# Patient Record
Sex: FEMALE | Race: WHITE | URBAN - METROPOLITAN AREA
[De-identification: names, ages, dates, MRNs, and addresses within clinical notes are randomized per-mention and may not be internally consistent; named-entity substitution may affect disease eponyms.]

---

## 2021-05-26 ENCOUNTER — RECORDS - HEALTHEAST (OUTPATIENT)
Dept: ADMINISTRATIVE | Facility: CLINIC | Age: 41
End: 2021-05-26

## 2021-05-29 ENCOUNTER — RECORDS - HEALTHEAST (OUTPATIENT)
Dept: ADMINISTRATIVE | Facility: CLINIC | Age: 41
End: 2021-05-29

## 2021-05-31 ENCOUNTER — RECORDS - HEALTHEAST (OUTPATIENT)
Dept: ADMINISTRATIVE | Facility: CLINIC | Age: 41
End: 2021-05-31

## 2025-02-27 ENCOUNTER — APPOINTMENT (OUTPATIENT)
Dept: CT IMAGING | Facility: HOSPITAL | Age: 45
DRG: 312 | End: 2025-02-27
Attending: EMERGENCY MEDICINE
Payer: COMMERCIAL

## 2025-02-27 ENCOUNTER — HOSPITAL ENCOUNTER (OUTPATIENT)
Facility: HOSPITAL | Age: 45
Setting detail: OBSERVATION
End: 2025-02-27
Attending: EMERGENCY MEDICINE | Admitting: INTERNAL MEDICINE
Payer: COMMERCIAL

## 2025-02-27 VITALS
WEIGHT: 136.69 LBS | BODY MASS INDEX: 24.22 KG/M2 | HEIGHT: 63 IN | DIASTOLIC BLOOD PRESSURE: 49 MMHG | SYSTOLIC BLOOD PRESSURE: 86 MMHG | HEART RATE: 73 BPM | RESPIRATION RATE: 18 BRPM | TEMPERATURE: 97.8 F | OXYGEN SATURATION: 94 %

## 2025-02-27 DIAGNOSIS — E83.42 HYPOMAGNESEMIA: ICD-10-CM

## 2025-02-27 DIAGNOSIS — E87.1 HYPONATREMIA: ICD-10-CM

## 2025-02-27 DIAGNOSIS — D64.9 ANEMIA, UNSPECIFIED TYPE: ICD-10-CM

## 2025-02-27 DIAGNOSIS — R55 SYNCOPE, UNSPECIFIED SYNCOPE TYPE: ICD-10-CM

## 2025-02-27 DIAGNOSIS — T14.8XXA INTRAMUSCULAR HEMATOMA: ICD-10-CM

## 2025-02-27 LAB
ABO + RH BLD: NORMAL
ANION GAP SERPL CALCULATED.3IONS-SCNC: 8 MMOL/L (ref 7–15)
BASOPHILS # BLD AUTO: 0 10E3/UL (ref 0–0.2)
BASOPHILS NFR BLD AUTO: 0 %
BLD GP AB SCN SERPL QL: NEGATIVE
BUN SERPL-MCNC: 9.4 MG/DL (ref 6–20)
CALCIUM SERPL-MCNC: 8.8 MG/DL (ref 8.8–10.4)
CHLORIDE SERPL-SCNC: 95 MMOL/L (ref 98–107)
CREAT SERPL-MCNC: 0.54 MG/DL (ref 0.51–0.95)
EGFRCR SERPLBLD CKD-EPI 2021: >90 ML/MIN/1.73M2
EOSINOPHIL # BLD AUTO: 0 10E3/UL (ref 0–0.7)
EOSINOPHIL NFR BLD AUTO: 0 %
ERYTHROCYTE [DISTWIDTH] IN BLOOD BY AUTOMATED COUNT: 13.3 % (ref 10–15)
GLUCOSE SERPL-MCNC: 231 MG/DL (ref 70–99)
HCO3 SERPL-SCNC: 26 MMOL/L (ref 22–29)
HCT VFR BLD AUTO: 28.6 % (ref 35–47)
HGB BLD-MCNC: 9.8 G/DL (ref 11.7–15.7)
IMM GRANULOCYTES # BLD: 0.1 10E3/UL
IMM GRANULOCYTES NFR BLD: 1 %
INR PPP: 1.07 (ref 0.85–1.15)
LYMPHOCYTES # BLD AUTO: 0.6 10E3/UL (ref 0.8–5.3)
LYMPHOCYTES NFR BLD AUTO: 5 %
MAGNESIUM SERPL-MCNC: 1.4 MG/DL (ref 1.7–2.3)
MCH RBC QN AUTO: 32.9 PG (ref 26.5–33)
MCHC RBC AUTO-ENTMCNC: 34.3 G/DL (ref 31.5–36.5)
MCV RBC AUTO: 96 FL (ref 78–100)
MONOCYTES # BLD AUTO: 0.5 10E3/UL (ref 0–1.3)
MONOCYTES NFR BLD AUTO: 4 %
NEUTROPHILS # BLD AUTO: 12.2 10E3/UL (ref 1.6–8.3)
NEUTROPHILS NFR BLD AUTO: 91 %
NRBC # BLD AUTO: 0 10E3/UL
NRBC BLD AUTO-RTO: 0 /100
PLATELET # BLD AUTO: 210 10E3/UL (ref 150–450)
POTASSIUM SERPL-SCNC: 4.3 MMOL/L (ref 3.4–5.3)
RBC # BLD AUTO: 2.98 10E6/UL (ref 3.8–5.2)
SODIUM SERPL-SCNC: 129 MMOL/L (ref 135–145)
SPECIMEN EXP DATE BLD: NORMAL
TROPONIN T SERPL HS-MCNC: <6 NG/L
WBC # BLD AUTO: 13.4 10E3/UL (ref 4–11)

## 2025-02-27 PROCEDURE — 82310 ASSAY OF CALCIUM: CPT | Performed by: EMERGENCY MEDICINE

## 2025-02-27 PROCEDURE — 250N000011 HC RX IP 250 OP 636: Performed by: EMERGENCY MEDICINE

## 2025-02-27 PROCEDURE — 84484 ASSAY OF TROPONIN QUANT: CPT | Performed by: EMERGENCY MEDICINE

## 2025-02-27 PROCEDURE — 85610 PROTHROMBIN TIME: CPT | Performed by: EMERGENCY MEDICINE

## 2025-02-27 PROCEDURE — 96374 THER/PROPH/DIAG INJ IV PUSH: CPT

## 2025-02-27 PROCEDURE — 80048 BASIC METABOLIC PNL TOTAL CA: CPT | Performed by: EMERGENCY MEDICINE

## 2025-02-27 PROCEDURE — 86850 RBC ANTIBODY SCREEN: CPT | Performed by: EMERGENCY MEDICINE

## 2025-02-27 PROCEDURE — 86900 BLOOD TYPING SEROLOGIC ABO: CPT | Performed by: EMERGENCY MEDICINE

## 2025-02-27 PROCEDURE — 93005 ELECTROCARDIOGRAM TRACING: CPT | Performed by: STUDENT IN AN ORGANIZED HEALTH CARE EDUCATION/TRAINING PROGRAM

## 2025-02-27 PROCEDURE — 83735 ASSAY OF MAGNESIUM: CPT | Performed by: EMERGENCY MEDICINE

## 2025-02-27 PROCEDURE — 85025 COMPLETE CBC W/AUTO DIFF WBC: CPT | Performed by: EMERGENCY MEDICINE

## 2025-02-27 PROCEDURE — G0378 HOSPITAL OBSERVATION PER HR: HCPCS

## 2025-02-27 PROCEDURE — 72191 CT ANGIOGRAPH PELV W/O&W/DYE: CPT

## 2025-02-27 PROCEDURE — 99285 EMERGENCY DEPT VISIT HI MDM: CPT | Mod: 25

## 2025-02-27 PROCEDURE — 258N000003 HC RX IP 258 OP 636: Performed by: EMERGENCY MEDICINE

## 2025-02-27 PROCEDURE — 99223 1ST HOSP IP/OBS HIGH 75: CPT | Performed by: INTERNAL MEDICINE

## 2025-02-27 PROCEDURE — 258N000003 HC RX IP 258 OP 636: Performed by: INTERNAL MEDICINE

## 2025-02-27 PROCEDURE — 36415 COLL VENOUS BLD VENIPUNCTURE: CPT | Performed by: EMERGENCY MEDICINE

## 2025-02-27 RX ORDER — MAGNESIUM SULFATE HEPTAHYDRATE 40 MG/ML
2 INJECTION, SOLUTION INTRAVENOUS ONCE
Status: COMPLETED | OUTPATIENT
Start: 2025-02-27 | End: 2025-02-27

## 2025-02-27 RX ORDER — HYDROMORPHONE HCL IN WATER/PF 6 MG/30 ML
0.4 PATIENT CONTROLLED ANALGESIA SYRINGE INTRAVENOUS
Status: ACTIVE | OUTPATIENT
Start: 2025-02-27

## 2025-02-27 RX ORDER — NALOXONE HYDROCHLORIDE 0.4 MG/ML
0.4 INJECTION, SOLUTION INTRAMUSCULAR; INTRAVENOUS; SUBCUTANEOUS
Status: ACTIVE | OUTPATIENT
Start: 2025-02-27

## 2025-02-27 RX ORDER — NICOTINE 21 MG/24HR
1 PATCH, TRANSDERMAL 24 HOURS TRANSDERMAL DAILY
Status: ACTIVE | OUTPATIENT
Start: 2025-02-28

## 2025-02-27 RX ORDER — OXYCODONE HYDROCHLORIDE 5 MG/1
5 TABLET ORAL EVERY 4 HOURS PRN
Status: ACTIVE | OUTPATIENT
Start: 2025-02-27

## 2025-02-27 RX ORDER — NALOXONE HYDROCHLORIDE 0.4 MG/ML
0.2 INJECTION, SOLUTION INTRAMUSCULAR; INTRAVENOUS; SUBCUTANEOUS
Status: ACTIVE | OUTPATIENT
Start: 2025-02-27

## 2025-02-27 RX ORDER — IOPAMIDOL 755 MG/ML
90 INJECTION, SOLUTION INTRAVASCULAR ONCE
Status: COMPLETED | OUTPATIENT
Start: 2025-02-27 | End: 2025-02-27

## 2025-02-27 RX ORDER — HYDROMORPHONE HCL IN WATER/PF 6 MG/30 ML
0.2 PATIENT CONTROLLED ANALGESIA SYRINGE INTRAVENOUS
Status: ACTIVE | OUTPATIENT
Start: 2025-02-27

## 2025-02-27 RX ORDER — ONDANSETRON 2 MG/ML
4 INJECTION INTRAMUSCULAR; INTRAVENOUS EVERY 6 HOURS PRN
Status: ACTIVE | OUTPATIENT
Start: 2025-02-27

## 2025-02-27 RX ORDER — ACETAMINOPHEN 500 MG
500-1000 TABLET ORAL EVERY 6 HOURS PRN
Status: ON HOLD | COMMUNITY

## 2025-02-27 RX ORDER — SODIUM CHLORIDE, SODIUM LACTATE, POTASSIUM CHLORIDE, CALCIUM CHLORIDE 600; 310; 30; 20 MG/100ML; MG/100ML; MG/100ML; MG/100ML
INJECTION, SOLUTION INTRAVENOUS CONTINUOUS
Status: ACTIVE | OUTPATIENT
Start: 2025-02-27

## 2025-02-27 RX ORDER — ONDANSETRON 4 MG/1
4 TABLET, ORALLY DISINTEGRATING ORAL EVERY 6 HOURS PRN
Status: ACTIVE | OUTPATIENT
Start: 2025-02-27

## 2025-02-27 RX ORDER — DOCUSATE SODIUM 100 MG/1
100 CAPSULE, LIQUID FILLED ORAL 2 TIMES DAILY
Status: ACTIVE | OUTPATIENT
Start: 2025-02-27

## 2025-02-27 RX ORDER — PROCHLORPERAZINE MALEATE 10 MG
10 TABLET ORAL EVERY 6 HOURS PRN
Status: ACTIVE | OUTPATIENT
Start: 2025-02-27

## 2025-02-27 RX ADMIN — MAGNESIUM SULFATE HEPTAHYDRATE 2 G: 40 INJECTION, SOLUTION INTRAVENOUS at 22:24

## 2025-02-27 RX ADMIN — IOPAMIDOL 90 ML: 755 INJECTION, SOLUTION INTRAVENOUS at 20:53

## 2025-02-27 RX ADMIN — SODIUM CHLORIDE, SODIUM LACTATE, POTASSIUM CHLORIDE, AND CALCIUM CHLORIDE: .6; .31; .03; .02 INJECTION, SOLUTION INTRAVENOUS at 23:52

## 2025-02-27 RX ADMIN — SODIUM CHLORIDE 1000 ML: 0.9 INJECTION, SOLUTION INTRAVENOUS at 22:24

## 2025-02-27 ASSESSMENT — COLUMBIA-SUICIDE SEVERITY RATING SCALE - C-SSRS
1. IN THE PAST MONTH, HAVE YOU WISHED YOU WERE DEAD OR WISHED YOU COULD GO TO SLEEP AND NOT WAKE UP?: NO
6. HAVE YOU EVER DONE ANYTHING, STARTED TO DO ANYTHING, OR PREPARED TO DO ANYTHING TO END YOUR LIFE?: NO
2. HAVE YOU ACTUALLY HAD ANY THOUGHTS OF KILLING YOURSELF IN THE PAST MONTH?: NO

## 2025-02-27 ASSESSMENT — ENCOUNTER SYMPTOMS
NUMBNESS: 0
DIZZINESS: 1
HEADACHES: 0

## 2025-02-27 ASSESSMENT — ACTIVITIES OF DAILY LIVING (ADL)
ADLS_ACUITY_SCORE: 41

## 2025-02-28 ENCOUNTER — APPOINTMENT (OUTPATIENT)
Dept: ULTRASOUND IMAGING | Facility: HOSPITAL | Age: 45
DRG: 312 | End: 2025-02-28
Attending: EMERGENCY MEDICINE
Payer: COMMERCIAL

## 2025-02-28 ENCOUNTER — APPOINTMENT (OUTPATIENT)
Dept: OCCUPATIONAL THERAPY | Facility: HOSPITAL | Age: 45
DRG: 312 | End: 2025-02-28
Attending: PHYSICIAN ASSISTANT
Payer: COMMERCIAL

## 2025-02-28 LAB
ALBUMIN SERPL BCG-MCNC: 3.5 G/DL (ref 3.5–5.2)
ALP SERPL-CCNC: 36 U/L (ref 40–150)
ALT SERPL W P-5'-P-CCNC: 17 U/L (ref 0–50)
ANION GAP SERPL CALCULATED.3IONS-SCNC: 4 MMOL/L (ref 7–15)
AST SERPL W P-5'-P-CCNC: 25 U/L (ref 0–45)
ATRIAL RATE - MUSE: 93 BPM
BASOPHILS # BLD AUTO: 0 10E3/UL (ref 0–0.2)
BASOPHILS NFR BLD AUTO: 0 %
BILIRUB SERPL-MCNC: 0.3 MG/DL
BUN SERPL-MCNC: 7 MG/DL (ref 6–20)
CALCIUM SERPL-MCNC: 8.9 MG/DL (ref 8.8–10.4)
CHLORIDE SERPL-SCNC: 108 MMOL/L (ref 98–107)
CREAT SERPL-MCNC: 0.59 MG/DL (ref 0.51–0.95)
DIASTOLIC BLOOD PRESSURE - MUSE: 68 MMHG
EGFRCR SERPLBLD CKD-EPI 2021: >90 ML/MIN/1.73M2
EOSINOPHIL # BLD AUTO: 0 10E3/UL (ref 0–0.7)
EOSINOPHIL NFR BLD AUTO: 0 %
ERYTHROCYTE [DISTWIDTH] IN BLOOD BY AUTOMATED COUNT: 13.4 % (ref 10–15)
GLUCOSE SERPL-MCNC: 115 MG/DL (ref 70–99)
HCO3 SERPL-SCNC: 28 MMOL/L (ref 22–29)
HCT VFR BLD AUTO: 24.4 % (ref 35–47)
HGB BLD-MCNC: 7.1 G/DL (ref 11.7–15.7)
HGB BLD-MCNC: 8.5 G/DL (ref 11.7–15.7)
HGB BLD-MCNC: 8.7 G/DL (ref 11.7–15.7)
HGB BLD-MCNC: 8.8 G/DL (ref 11.7–15.7)
IMM GRANULOCYTES # BLD: 0 10E3/UL
IMM GRANULOCYTES NFR BLD: 0 %
INTERPRETATION ECG - MUSE: NORMAL
LYMPHOCYTES # BLD AUTO: 1.1 10E3/UL (ref 0.8–5.3)
LYMPHOCYTES NFR BLD AUTO: 10 %
MAGNESIUM SERPL-MCNC: 2.2 MG/DL (ref 1.7–2.3)
MCH RBC QN AUTO: 33.7 PG (ref 26.5–33)
MCHC RBC AUTO-ENTMCNC: 34.8 G/DL (ref 31.5–36.5)
MCV RBC AUTO: 97 FL (ref 78–100)
MONOCYTES # BLD AUTO: 1.1 10E3/UL (ref 0–1.3)
MONOCYTES NFR BLD AUTO: 10 %
NEUTROPHILS # BLD AUTO: 9.3 10E3/UL (ref 1.6–8.3)
NEUTROPHILS NFR BLD AUTO: 80 %
NRBC # BLD AUTO: 0 10E3/UL
NRBC BLD AUTO-RTO: 0 /100
P AXIS - MUSE: 71 DEGREES
PLATELET # BLD AUTO: 183 10E3/UL (ref 150–450)
POTASSIUM SERPL-SCNC: 4.2 MMOL/L (ref 3.4–5.3)
PR INTERVAL - MUSE: 120 MS
PROT SERPL-MCNC: 5.4 G/DL (ref 6.4–8.3)
QRS DURATION - MUSE: 74 MS
QT - MUSE: 374 MS
QTC - MUSE: 465 MS
R AXIS - MUSE: 12 DEGREES
RBC # BLD AUTO: 2.52 10E6/UL (ref 3.8–5.2)
SODIUM SERPL-SCNC: 140 MMOL/L (ref 135–145)
SYSTOLIC BLOOD PRESSURE - MUSE: 117 MMHG
T AXIS - MUSE: 41 DEGREES
VENTRICULAR RATE- MUSE: 93 BPM
WBC # BLD AUTO: 11.6 10E3/UL (ref 4–11)

## 2025-02-28 PROCEDURE — 99233 SBSQ HOSP IP/OBS HIGH 50: CPT | Performed by: INTERNAL MEDICINE

## 2025-02-28 PROCEDURE — 83735 ASSAY OF MAGNESIUM: CPT | Performed by: INTERNAL MEDICINE

## 2025-02-28 PROCEDURE — 93971 EXTREMITY STUDY: CPT | Mod: RT

## 2025-02-28 PROCEDURE — 97165 OT EVAL LOW COMPLEX 30 MIN: CPT | Mod: GO

## 2025-02-28 PROCEDURE — 250N000013 HC RX MED GY IP 250 OP 250 PS 637: Performed by: PHYSICIAN ASSISTANT

## 2025-02-28 PROCEDURE — 36415 COLL VENOUS BLD VENIPUNCTURE: CPT | Performed by: INTERNAL MEDICINE

## 2025-02-28 PROCEDURE — 120N000001 HC R&B MED SURG/OB

## 2025-02-28 PROCEDURE — 82040 ASSAY OF SERUM ALBUMIN: CPT | Performed by: INTERNAL MEDICINE

## 2025-02-28 PROCEDURE — 85018 HEMOGLOBIN: CPT | Performed by: INTERNAL MEDICINE

## 2025-02-28 PROCEDURE — 258N000003 HC RX IP 258 OP 636: Performed by: INTERNAL MEDICINE

## 2025-02-28 PROCEDURE — 250N000013 HC RX MED GY IP 250 OP 250 PS 637: Performed by: INTERNAL MEDICINE

## 2025-02-28 PROCEDURE — 85014 HEMATOCRIT: CPT | Performed by: INTERNAL MEDICINE

## 2025-02-28 PROCEDURE — G0378 HOSPITAL OBSERVATION PER HR: HCPCS

## 2025-02-28 PROCEDURE — 80053 COMPREHEN METABOLIC PANEL: CPT | Performed by: INTERNAL MEDICINE

## 2025-02-28 PROCEDURE — 85025 COMPLETE CBC W/AUTO DIFF WBC: CPT | Performed by: INTERNAL MEDICINE

## 2025-02-28 PROCEDURE — 97535 SELF CARE MNGMENT TRAINING: CPT | Mod: GO

## 2025-02-28 RX ORDER — HYDROXYZINE HYDROCHLORIDE 25 MG/1
25 TABLET, FILM COATED ORAL EVERY 6 HOURS PRN
Status: DISCONTINUED | OUTPATIENT
Start: 2025-02-28 | End: 2025-03-02 | Stop reason: HOSPADM

## 2025-02-28 RX ORDER — CEPHALEXIN 500 MG/1
500 CAPSULE ORAL EVERY 6 HOURS SCHEDULED
Status: DISCONTINUED | OUTPATIENT
Start: 2025-02-28 | End: 2025-03-02 | Stop reason: HOSPADM

## 2025-02-28 RX ORDER — ACETAMINOPHEN 325 MG/1
975 TABLET ORAL EVERY 8 HOURS
Status: DISCONTINUED | OUTPATIENT
Start: 2025-02-28 | End: 2025-03-02 | Stop reason: HOSPADM

## 2025-02-28 RX ORDER — ASPIRIN 81 MG/1
81 TABLET, CHEWABLE ORAL 2 TIMES DAILY
Status: DISCONTINUED | OUTPATIENT
Start: 2025-02-28 | End: 2025-03-02 | Stop reason: HOSPADM

## 2025-02-28 RX ORDER — HYDROXYZINE HYDROCHLORIDE 50 MG/1
50 TABLET, FILM COATED ORAL EVERY 6 HOURS PRN
Status: DISCONTINUED | OUTPATIENT
Start: 2025-02-28 | End: 2025-03-02 | Stop reason: HOSPADM

## 2025-02-28 RX ORDER — SODIUM CHLORIDE 9 MG/ML
INJECTION, SOLUTION INTRAVENOUS CONTINUOUS
Status: DISCONTINUED | OUTPATIENT
Start: 2025-02-28 | End: 2025-02-28

## 2025-02-28 RX ADMIN — SODIUM CHLORIDE: 0.9 INJECTION, SOLUTION INTRAVENOUS at 00:25

## 2025-02-28 RX ADMIN — ACETAMINOPHEN 975 MG: 325 TABLET, FILM COATED ORAL at 10:40

## 2025-02-28 RX ADMIN — OXYCODONE HYDROCHLORIDE 2.5 MG: 5 TABLET ORAL at 17:23

## 2025-02-28 RX ADMIN — SODIUM CHLORIDE 500 ML: 0.9 INJECTION, SOLUTION INTRAVENOUS at 00:28

## 2025-02-28 RX ADMIN — ACETAMINOPHEN 975 MG: 325 TABLET, FILM COATED ORAL at 18:37

## 2025-02-28 RX ADMIN — HYDROXYZINE HYDROCHLORIDE 25 MG: 25 TABLET, FILM COATED ORAL at 15:49

## 2025-02-28 RX ADMIN — NICOTINE 1 PATCH: 14 PATCH, EXTENDED RELEASE TRANSDERMAL at 08:21

## 2025-02-28 RX ADMIN — DOCUSATE SODIUM 100 MG: 100 CAPSULE, LIQUID FILLED ORAL at 20:13

## 2025-02-28 RX ADMIN — ASPIRIN 81 MG CHEWABLE TABLET 81 MG: 81 TABLET CHEWABLE at 10:40

## 2025-02-28 RX ADMIN — CEPHALEXIN 500 MG: 500 CAPSULE ORAL at 12:26

## 2025-02-28 RX ADMIN — SODIUM CHLORIDE: 0.9 INJECTION, SOLUTION INTRAVENOUS at 06:04

## 2025-02-28 RX ADMIN — CEPHALEXIN 500 MG: 500 CAPSULE ORAL at 17:21

## 2025-02-28 RX ADMIN — ASPIRIN 81 MG CHEWABLE TABLET 81 MG: 81 TABLET CHEWABLE at 20:13

## 2025-02-28 ASSESSMENT — ACTIVITIES OF DAILY LIVING (ADL)
ADLS_ACUITY_SCORE: 39
ADLS_ACUITY_SCORE: 41
ADLS_ACUITY_SCORE: 39
ADLS_ACUITY_SCORE: 24
ADLS_ACUITY_SCORE: 44
ADLS_ACUITY_SCORE: 41
ADLS_ACUITY_SCORE: 41
ADLS_ACUITY_SCORE: 24
ADLS_ACUITY_SCORE: 39
ADLS_ACUITY_SCORE: 38
ADLS_ACUITY_SCORE: 39
ADLS_ACUITY_SCORE: 24
ADLS_ACUITY_SCORE: 39
ADLS_ACUITY_SCORE: 44
ADLS_ACUITY_SCORE: 39
ADLS_ACUITY_SCORE: 41
ADLS_ACUITY_SCORE: 39
ADLS_ACUITY_SCORE: 39
ADLS_ACUITY_SCORE: 24

## 2025-02-28 NOTE — ED NOTES
Bed: [facility]-10  Expected date:   Expected time:   Means of arrival:   Comments:  [facility]; 44F post op hip; seizure

## 2025-02-28 NOTE — ED TRIAGE NOTES
Patient with right hip replacement this AM.  Per report, patient had episode of hypotension post procedure- SBP 50's and given norepinephrine.  Later this evening around 1700, patient was up and walking with staff when she had a syncopal episode.  C/o dizziness prior to syncope.  Patient was walking with staff, did not fall or sustain any injury. Staff there reported possible seizure like activity and assisted back into bed.  Paperwork with patient appears to be all intra-procedure. Unable to find how much or when the norepinephrine was given.  Given sedation, IVF, and ABX as well according to paperwork.      Triage Assessment (Adult)       Row Name 02/27/25 3286          Triage Assessment    Airway WDL WDL        Respiratory WDL    Respiratory WDL WDL        Skin Circulation/Temperature WDL    Skin Circulation/Temperature WDL temperature;circulation     Skin Circulation pallor     Skin Temperature neutral        Cardiac WDL    Cardiac WDL WDL        Peripheral/Neurovascular WDL    Peripheral Neurovascular WDL WDL        Cognitive/Neuro/Behavioral WDL    Cognitive/Neuro/Behavioral WDL WDL

## 2025-02-28 NOTE — ED PROVIDER NOTES
EMERGENCY DEPARTMENT ENCOUNTER      NAME: Symone Negrete  AGE: 44 year old female  YOB: 1980  MRN: [id number]  EVALUATION DATE & TIME: 2025  6:13 PM    PCP: No primary care provider on file.    ED PROVIDER: Lizeth Mon DO      Chief Complaint   Patient presents with    Syncope         FINAL IMPRESSION:  1. Syncope, unspecified syncope type    2. Anemia, unspecified type    3. Hypomagnesemia    4. Hyponatremia    5. Intramuscular hematoma          ED COURSE & MEDICAL DECISION MAKIN-year-old female who underwent a right total hip replacement earlier today was sent into the ED from an orthopedic surgical center after she experienced a syncopal episode.  The patient reportedly had experiencing hypotension following surgery.  She also reportedly received a dose of norepinephrine and attempted to increase her blood pressures prior to ED transfer.  There was also concern for a possible seizure activity since the patient experienced jerking-like movements for 10 to 15 seconds during the syncopal episode.  The patient states that she was aware of her surroundings as she woke up from the syncopal episode.  Patient had no other significant complaints upon arrival to the ED.  Patient was hemodynamically stable with a blood pressure of 117/68 upon arrival.  On exam the patient was pale appearing.  Her right thigh appeared to be moderately swollen and firm to palpation when compared to the left.  However, the patient did not have any tenderness to palpation or crepitus noted on exam.  There were no neurovascular deficits noted in the right lower extremity when compared to the left.      An EKG was obtained which revealed sinus tachycardia without any concerning ST or T wave changes.     CBC shows no elevated white blood cell count of 13.4.  The patient's hemoglobin was 9.8.  The most recent hemoglobin for comparison was 13.4 noted 2 weeks ago.  Her troponin was less than 6.  Patient was also noted to  have a sodium of 129 and a magnesium of 1.4.    The patient was given a liter of normal saline as well as IV magnesium.      CTA of the right thigh shows a small intramuscular hematoma with no active extravasation.    The patient was reevaluated and informed of the lab and imaging results.  The patient appeared to be more awake and alert at the time reevaluation.  The patient denied experiencing any repeat episodes of syncope or near syncope while in the ED.  However, because the underlying etiology for her syncopal episode angel unclear the patient will be observed overnight.  The patient was noted to have dark pan-colored urine in the ED despite receiving 3 L of normal saline outside of the ED earlier today.  It is possible that the patient's hypotension and resulting syncopal episode are due to dehydration.      The patient's case was discussed with the admitting hospitalist Dr. Frazier.     Pertinent Labs & Imaging studies reviewed. (See chart for details)  6:37 PM I met with the patient to gather history and to perform my initial exam. We discussed plans for the ED course, including diagnostic testing and treatment.   8:04 PM I spoke with CT.      At the conclusion of the encounter I discussed the results of all of the tests and the disposition. The questions were answered. The patient or family acknowledged understanding and was agreeable with the care plan.     Medical Decision Making    History:  Supplemental history from: Family Member/Significant Other  External Record(s) reviewed: Documented in chart    Work Up:  Chart documentation includes differential considered and any EKGs or imaging independently interpreted by provider, where specified.  In additional to work up documented, I considered the following work up: Documented in chart, if applicable.    External consultation:  Discussion of management with another provider: Documented in chart, if applicable    Complicating factors:  Care impacted by  chronic illness: Documented in Chart  Care affected by social determinants of health: N/A    Disposition considerations: Admit.    Not Applicable      PPE worn: n95 mask, goggles    MEDICATIONS GIVEN IN THE EMERGENCY:  Medications   magnesium sulfate 2 g in 50 mL sterile water intermittent infusion (2 g Intravenous $New Bag 2/27/25 2224)   iopamidol (ISOVUE-370) solution 90 mL (90 mLs Intravenous $Given 2/27/25 2053)   sodium chloride 0.9% BOLUS 1,000 mL (1,000 mLs Intravenous $New Bag 2/27/25 2224)       NEW PRESCRIPTIONS STARTED AT TODAY'S ER VISIT  New Prescriptions    No medications on file          =================================================================    HPI    Patient information was obtained from: Patient, , RN    Use of : N/A         Symone Negrete is a 44 year old female with a pertinent history of arthritis of the right hip who presents to this ED by EMS for evaluation of syncope.     Per the patient, she had a right hip replacement this morning due to arthritis in the right hip. Post procedure (at about 5 PM today), she stood up with staff assistance to walk to the bathroom. She was asked by staff if she was dizzy during this walk and responded yes, but this is all that she remembered. When she came to, she was laying back on her bed. She knew where she was at and who the people around her were upon waking up. Patient was told by staff there that she had shown seizure like activity during this syncopal episode. She denies chest pain or chest pressure before the syncopal episode. No injuries from this syncopal episode. She states that she has never had a syncopal episode in the past. Patient had a tubal ligation 16 years ago but did not experience any complications with anesthesia then.     In the ED, the patient reports that she developed chest pain upon arrival to the ED. Denies worsening hip pain or swelling, headache, vision changes or numbness. She otherwise feels okay  "and has no other concerns at this time.     Per patient's , when the patient returned to her room after surgery, her blood pressure was 106 systolic. She later \"lost her color\" while laying down and became dizzy. She was given more fluids in addition to the fluids that she received before surgery.     Per nursing report, the patient became hypotensive in the 50's systolic and EMS gave her norepinephrine.    Per chart review, the patient had a pre-op examination on 2/11/2025. Patient had a year of right hip pain, worse in the last month. She had an x-ray that showed arthritis and bursitis, and she was recommend for a hip replacement. She was scheduled for a right hip replacement with Dr. Rohit Cisneros at [facility]. She was instructed to stop taking ibuprofen and all other OTC medications before surgery.      REVIEW OF SYSTEMS   Review of Systems   Eyes:  Negative for visual disturbance.   Cardiovascular:  Positive for chest pain.   Neurological:  Positive for dizziness (resolved) and syncope. Negative for numbness and headaches.   All other systems reviewed and are negative.       PAST MEDICAL HISTORY:  History reviewed. No pertinent past medical history.    PAST SURGICAL HISTORY:  History reviewed. No pertinent surgical history.        CURRENT MEDICATIONS:    acetaminophen (TYLENOL) 500 MG tablet        ALLERGIES:  No Known Allergies    FAMILY HISTORY:  History reviewed. No pertinent family history.    SOCIAL HISTORY:   Social History     Socioeconomic History    Marital status:      Spouse name: None    Number of children: None    Years of education: None    Highest education level: None       VITALS:  /56   Pulse 71   Temp 97.8  F (36.6  C) (Oral)   Resp 28   Ht 1.6 m (5' 3\")   Wt 62 kg (136 lb 11 oz)   SpO2 100%   BMI 24.21 kg/m      PHYSICAL EXAM    General presentation: Alert, Vital signs reviewed. NAD. Fatigued appearing.   HENT: ENT inspection is normal. " Oropharynx is moist and clear.   Eye: Pupils are equal and reactive to light. EOMI  Neck: The neck is supple, with full ROM, with no evidence of meningismus.  Pulmonary: Currently in no acute respiratory distress. Normal, non labored respirations, the lung sounds are normal with good equal air movement. Clear to auscultation bilaterally.   Circulatory: Regular rate and rhythm. No murmurs, rubs, or gallops.  Dorsalis pedis pulses are 2/4 in the bilateral lower extremities  Abdominal: The abdomen is soft. Nontender. No rigidity, guarding, or rebound. Bowel sounds normal.   Neurologic: Alert, oriented to person, place, and time.  Strength is 5/5 in the bilateral lower extremities.  There are no motor or sensory deficits noted in the right lower leg when compared to the left. Cranial nerves II through XII are intact.  Musculoskeletal: The right thigh is moderately swollen and slightly firm to palpation when compared to the left.  No erythema, warmth, tenderness palpation, or crepitus is noted within the right thigh.  Full range of motion in all extremities. No extremity edema.   Skin: Skin color is pale. No rash. Warm. Dry to touch.      LAB:  All pertinent labs reviewed and interpreted.  Results for orders placed or performed during the hospital encounter of 02/27/25   CT Pelvis Angio w/o & w Contrast    Impression    IMPRESSION:  1.  Postsurgical changes of right total hip arthroplasty with a small intramuscular hematoma anterior to the right femoral neck/proximal femur. No active extravasation.     Basic metabolic panel   Result Value Ref Range    Sodium 129 (L) 135 - 145 mmol/L    Potassium 4.3 3.4 - 5.3 mmol/L    Chloride 95 (L) 98 - 107 mmol/L    Carbon Dioxide (CO2) 26 22 - 29 mmol/L    Anion Gap 8 7 - 15 mmol/L    Urea Nitrogen 9.4 6.0 - 20.0 mg/dL    Creatinine 0.54 0.51 - 0.95 mg/dL    GFR Estimate >90 >60 mL/min/1.73m2    Calcium 8.8 8.8 - 10.4 mg/dL    Glucose 231 (H) 70 - 99 mg/dL   Result Value Ref Range     Troponin T, High Sensitivity <6 <=14 ng/L   Result Value Ref Range    Magnesium 1.4 (L) 1.7 - 2.3 mg/dL   Result Value Ref Range    INR 1.07 0.85 - 1.15   CBC with platelets and differential   Result Value Ref Range    WBC Count 13.4 (H) 4.0 - 11.0 10e3/uL    RBC Count 2.98 (L) 3.80 - 5.20 10e6/uL    Hemoglobin 9.8 (L) 11.7 - 15.7 g/dL    Hematocrit 28.6 (L) 35.0 - 47.0 %    MCV 96 78 - 100 fL    MCH 32.9 26.5 - 33.0 pg    MCHC 34.3 31.5 - 36.5 g/dL    RDW 13.3 10.0 - 15.0 %    Platelet Count 210 150 - 450 10e3/uL    % Neutrophils 91 %    % Lymphocytes 5 %    % Monocytes 4 %    % Eosinophils 0 %    % Basophils 0 %    % Immature Granulocytes 1 %    NRBCs per 100 WBC 0 <1 /100    Absolute Neutrophils 12.2 (H) 1.6 - 8.3 10e3/uL    Absolute Lymphocytes 0.6 (L) 0.8 - 5.3 10e3/uL    Absolute Monocytes 0.5 0.0 - 1.3 10e3/uL    Absolute Eosinophils 0.0 0.0 - 0.7 10e3/uL    Absolute Basophils 0.0 0.0 - 0.2 10e3/uL    Absolute Immature Granulocytes 0.1 <=0.4 10e3/uL    Absolute NRBCs 0.0 10e3/uL   Adult Type and Screen   Result Value Ref Range    ABO/RH(D) A NEG     Antibody Screen Negative Negative    SPECIMEN EXPIRATION DATE [id number]        RADIOLOGY:  Reviewed all pertinent imaging. Please see official radiology report.  CT Pelvis Angio w/o & w Contrast   Final Result   IMPRESSION:   1.  Postsurgical changes of right total hip arthroplasty with a small intramuscular hematoma anterior to the right femoral neck/proximal femur. No active extravasation.         US Lower Extremity Venous Duplex Right    (Results Pending)       EKG:    Normal sinus rhythm.  Rate of 93.  Normal QRS.  Normal QT.  No ST or T wave changes.  No previous EKG available for comparison      I have independently reviewed and interpreted the EKG(s) documented above.      I, Valdez Quispe , am serving as a scribe to document services personally performed by Jaremy Yaa, DO based on my observation and the provider's statements to me. I, Lizeth Mon,  attest that Valdez Quispe is acting in a scribe capacity, has observed my performance of the services and has documented them in accordance with my direction.    Lizeth Mon DO  Emergency Medicine  [facility] EMERGENCY DEPARTMENT  [address] [place] [id number]  [phone]       Lizeth Mon DO  02/27/25 2793

## 2025-02-28 NOTE — PHARMACY-ADMISSION MEDICATION HISTORY
Pharmacist Admission Medication History    Admission medication history is complete. The information provided in this note is only as accurate as the sources available at the time of the update.    Information Source(s): Patient, Clinic records, Hospital records, and CareEverywhere/SureScripts via in-person    Pertinent Information:     Patient was prescribed hydroxyzine, ketorolac, ondansetron, and oxycodone in anticipate of starting after today's surgery.    Changes made to PTA medication list:  Added: APAP  Deleted: None  Changed: None    Allergies reviewed with patient and updates made in EHR: yes    Medication History Completed By: Servando Lord RPH 2/27/2025 9:02 PM    PTA Med List   Medication Sig Last Dose/Taking    acetaminophen (TYLENOL) 500 MG tablet Take 500-1,000 mg by mouth every 6 hours as needed for mild pain. 2/26/2025

## 2025-02-28 NOTE — ED NOTES
"[facility] ED Handoff Report    ED Chief Complaint: syncopal episode    ED Diagnosis:  (R55) Syncope, unspecified syncope type  Comment: Post hip surgery at [place]  Plan:     (D64.9) Anemia, unspecified type  Comment:   Plan:     (E83.42) Hypomagnesemia  Comment: 1.6  Plan: Replaced    (E87.1) Hyponatremia  Comment: 129  Plan: NS bolus    (T14.8XXA) Intramuscular hematoma  Comment:   Plan: Ice       PMH:  History reviewed. No pertinent past medical history.     Code Status:  No Order     Falls Risk: Yes Band: Applied    Current Living Situation/Residence: lives with a significant other     Elimination Status: Continent: Yes     Activity Level: SBA w/ walker    Patients Preferred Language:  English     Needed: No    Vital Signs:  BP 91/53   Pulse 75   Temp 97.8  F (36.6  C) (Oral)   Resp 28   Ht 1.6 m (5' 3\")   Wt 62 kg (136 lb 11 oz)   SpO2 100%   BMI 24.21 kg/m       Cardiac Rhythm: NSR    Pain Score: 0/10    Is the Patient Confused:  No    Last Food or Drink: 02/27/25 at 2300 water    Focused Assessment:  Pt had hip surgery at [place]. Post operatively she was hypotensive, receiving Norepi X2. Pt up to walk with [place] nurses and had syncopal episode that they believed looked like a seizure. Pt arrived alert et oriented, not post-ictal. No hx of seizures. Right thigh larger than left. Hematoma noted on CT. Ice applied. Pt also had Hgb drop from pre-op labs. Skin pale. Pt had block put in for pain control and has no pain at this time.  will be staying with pt.    Tests Performed: Done: Labs and Imaging    Treatments Provided:  IVFs, Mg replacement    Family Dynamics/Concerns: No    Family Updated On Visitor Policy: Yes    Plan of Care Communicated to Family: Yes    Who Was Updated about Plan of Care:     Belongings Checklist Done and Signed by Patient: Yes    Medications sent with patient: None    Covid: asymptomatic , not tested    Additional Information: Ice pack to " thigh    Joycelyn Valverde RN 2/27/2025 10:59 PM

## 2025-02-28 NOTE — PROGRESS NOTES
[facility]    Medicine Progress Note - Hospitalist Service    Date of Admission:  2/27/2025    Assessment & Plan      Symone Negrete is a 44 year old female with severe right hip arthritis s/p right NIEVES 2/27/2025 with postoperative complication of hypotension requiring an unspecified amount of norepinephrine prompting admission to [facility] on 2/27/2025.  Hospital course complicated by acute blood loss anemia (CTA pelvis with small right intramuscular hematoma) and syncopal episode in ER while ambulating with extremity jerking.  If hemoglobin continues to stabilize by tomorrow morning, can discharge tomorrow    Syncope  - Suspect orthostatic hypotension given volume depletion in previous days in setting of intramuscular hematoma as below  - S/p NS bolus 1.5 L in ER  Plan  - Discontinue  ml/hr IV today  - Reports of possible extremity jerks during syncopal events, but low suspicions for seizure-like activity.  Based on this, will not initiate antiepileptics or start seizure workup.  If seizure activity were to happen again, will initiate workup/antiepileptics at that point    Postoperative acute Blood Loss Anemia  R intramuscular hematoma post R NIEVES 2/27/2025  CTA pelvis 2/27/2025: 5 x 6 cm intramuscular hematoma around right hip  Hemoglobin 2/11/2025:13 --> 2/27/2025: 9.8  No active bleeding noted  Plan  Orthopedic consulted, no need for inpatient surgery.  Weightbearing as tolerated, outpatient follow-up in 2 weeks and Keflex x 7 days postop  Type and screen performed on initial admission  Transfuse if HgB < 7    Hypomagnesemia - resolved. Magnesium repletion to correct deficiency.  Hypovolemic hyponatremia - resolved  Chronic Pain Syndrome - Manage with oxycodone as needed and intravenous Dilaudid as needed, ensuring adequate pain relief while monitoring for side effects.  Nicotine Use - Provide nicotine replacement therapy to manage withdrawal symptoms and support  smoking cessation efforts.    Diet:  Regular  DVT Prophylaxis: Pneumatic Compression Devices  Ordoñez Catheter: Not present  Lines: None     Cardiac Monitoring: None  Code Status:  Full    Disposition Plan     Medically Ready for Discharge: Anticipate tomorrow if HgB stabilizes    Glenis Rodríguez DO  Hospitalist Service  [facility]  Securely message with [employer] (more info)  Text page via [employer] Paging/Directory   ______________________________________________________________________    Interval History   Overnight had transient hypotension during transfer process which resolved with IVF.    On evaluation this morning, is resting on the bed and accompanied by numerous family members. Reports no further hypotensive or syncopal events since transfer to floor.  Was able to walk to the bathroom with PT with discomfort to right hip but no dizziness or weakness.  She and family were updated of plans to monitor overnight and if hemoglobin stabilizes, can discharge as early as tomorrow.    Physical Exam   Vital Signs: Temp: 99.2  F (37.3  C) Temp src: Oral BP: 106/58 Pulse: 97   Resp: 18 SpO2: 100 % O2 Device: None (Room air)    Weight: 136 lbs 10.96 oz    General Aox3, appropriate affect, NAD, on RA  HEENT  MMM, EOMI, PERRL  Chest Adeq E b/l, No wheezing  Heart RRR, No M/R/G  Abd- Soft, NT, BS+  - Deferred,   Extremity- Moving all extremities, No digital clubbing,   No edema  Neuro- Aox3, grossly non focal. S/p right hip arthroplasty    gait not checked + hematoma at surgical  site with no active bleeding  Skin  Has no tattoo, No skin rash    Medical Decision Making       60 MINUTES SPENT BY ME on the date of service doing chart review, history, exam, documentation & further activities per the note.      Data   ------------------------- PAST 24 HR DATA REVIEWED -----------------------------------------------

## 2025-02-28 NOTE — PROGRESS NOTES
PRIMARY DIAGNOSIS: SYNCOPE/TIA  OUTPATIENT/OBSERVATION GOALS TO BE MET BEFORE DISCHARGE:  1. Orthostatic performed: No. Bedrest. BP stable. Denies lightheadedness/dizziness.    2. Diagnostic testing complete & at baseline neurologic testing: Yes. Ultrasound negative for DVT in right leg. Scheduled labs at 6am.    3. Cleared by consultants (if involved): No    4. Interpretation of cardiac rhythm per telemetry tech: not on tele    5. Tolerating adequate PO diet and medications: Yes    6. Return to near baseline physical activity or neurologic status: No    Discharge Planner Nurse   Safe discharge environment identified: No  Barriers to discharge: Yes       Entered by: [place] RN 02/28/2025 4:57 AM     Please review provider order for any additional goals.   Nurse to notify provider when observation goals have been met and patient is ready for discharge.

## 2025-02-28 NOTE — PROGRESS NOTES
Patient alert, oriented, pleasant--has very supportive family. Has been increasing activity-- up to bathroom with staff or family. Dressing dry and intact, eating and drinking well, denies nausea. Passing gas, voiding good amounts. Hgb is being monitored, will follow.

## 2025-02-28 NOTE — PROGRESS NOTES
02/28/25 1110   Appointment Info   Signing Clinician's Name / Credentials (OT) Paige Frommelt OTD OTR/L   Quick Adds   Quick Adds [facility] Auth & Certification   Living Environment   People in Home spouse;child(joel), adult;child(joel), dependent  (3 children 21, 18, 15 years old)   Current Living Arrangements house   Home Accessibility stairs to enter home   Number of Stairs, Main Entrance 5   Living Environment Comments Able to live on one level, tub/shower with grab bars and tub bench, standard height toilets near [facility]   Self-Care   Equipment Currently Used at Home none;other (see comments)  (owns walker and cane)   Fall history within last six months no   Activity/Exercise/Self-Care Comment Typically ind with all ADLs and IADLs - works full time   General Information   Onset of Illness/Injury or Date of Surgery 02/27/25   Referring Physician Yomi Marquez PA-C   Patient/Family Therapy Goal Statement (OT) To return home   Additional Occupational Profile Info/Pertinent History of Current Problem Patient is 1 day status post right total hip arthroplasty on 2/27/2025 with Dr. Cisneros and one of our outpatient surgery centers.  Patient demonstrated hypotension following the surgery and was transferred to the emergency department here at [facility] for further management   Existing Precautions/Restrictions weight bearing;fall   Right Lower Extremity (Weight-bearing Status) weight-bearing as tolerated (WBAT)   Cognitive Status Examination   Orientation Status orientation to person, place and time   Affect/Mental Status (Cognitive) WNL   Follows Commands WNL   Visual Perception   Visual Impairment/Limitations corrective lenses full-time   Posture   Posture not impaired   Range of Motion Comprehensive   General Range of Motion no range of motion deficits identified   Strength Comprehensive (MMT)   General Manual Muscle Testing (MMT) Assessment no strength deficits identified   Bed Mobility   Bed Mobility  supine-sit   Supine-Sit [place] (Bed Mobility) minimum assist (75% patient effort)   Comment (Bed Mobility) HOB elevated   Transfers   Transfers sit-stand transfer;toilet transfer;shower transfer   Sit-Stand Transfer   Sit-Stand [place] (Transfers) contact guard   Assistive Device (Sit-Stand Transfers) walker, front-wheeled   Shower Transfer   [place] Level (Shower Transfer) minimum assist (75% patient effort)   Toilet Transfer   [place] Level (Toilet Transfer) minimum assist (75% patient effort)   Assistive Device (Toilet Transfer) walker, front-wheeled   Balance   Balance Assessment standing dynamic balance   Standing Balance: Dynamic contact guard   Activities of Daily Living   BADL Assessment/Intervention lower body dressing;toileting   Lower Body Dressing Assessment/Training   [place] Level (Lower Body Dressing) maximum assist (25% patient effort)   Toileting   [place] Level (Toileting) minimum assist (75% patient effort)   Clinical Impression   Criteria for Skilled Therapeutic Interventions Met (OT) Yes, treatment indicated   OT Diagnosis Decreased ind with ADLs and safety   Influenced by the following impairments S/p NIEVES, complicated by syncope/hypotension   OT Problem List-Impairments impacting ADL pain;post-surgical precautions;mobility   Assessment of Occupational Performance 3-5 Performance Deficits   Identified Performance Deficits dressing, toileting, bathing, fxl transfers   Planned Therapy Interventions (OT) ADL retraining;bed mobility training;balance training;progressive activity/exercise;risk factor education;transfer training   Clinical Decision Making Complexity (OT) problem focused assessment/low complexity   Risk & Benefits of therapy have been explained evaluation/treatment results reviewed;care plan/treatment goals reviewed;risks/benefits reviewed;current/potential barriers reviewed;patient;spouse/significant other   OT Total Evaluation Time   OT Eval, Low  Complexity Minutes (76511) 8   Therapy Certification   Medical Diagnosis S/p NIEVES   Start of Care Date 02/28/25   Certification date from 02/28/25   Certification date to 02/28/25   [facility] Authorization Information   Condition type Initial onset (within last 3 months)   Cause of current episode Post Surgical   Nature of treatment Rehabilitative   Functional ability Minimal functional limitations   Documented POC (choose all that apply) Measurable short and long term/discharge treatment goals related to physical and functional deficits.;Frequency of treatment visits and treatment activities to address deficit areas.;Patient agrees to program participation including home program   Briefly describe symptoms NIEVES   How did the symptoms start Post-surgical   Last 24H: avg pain/symptom intensity  1/10   Past wk: avg pain/symptom intensity 2/10   Frequency of Symptoms Occasionally (26-50% of the time)   Symptom impact on ADLs A little bit   Condition change since eval N/A (first visit)   General health reported by patient Good   Type of surgery 5-Joint Replacement   OT Goals   Therapy Frequency (OT) One time eval and treatment   OT Predicted Duration/Target Date for Goal Attainment 02/28/25   OT Goals Lower Body Dressing;Toilet Transfer/Toileting;Transfers   OT: Lower Body Dressing Minimal assist;Goal Met   OT: Transfer Supervision/stand-by assist;within precautions  (tub/shower transfer)   OT: Toilet Transfer/Toileting Supervision/stand-by assist;toilet transfer;cleaning and garment management;Goal Met   Self-Care/Home Management   Self-Care/Home Mgmt/ADL, Compensatory, Meal Prep Minutes (37995) 24   Symptoms Noted During/After Treatment (Meal Preparation/Planning Training) none   Treatment Detail/Skilled Intervention Eval completed, treatment initiated. Tolerated upright sitting ~ 5 min EOB with no dizziness/lightheadedness reported. Transfer to chair CGA with min cueing for walker safety. Cueing for hand placement with all  transfers. Fxl mob to bathroom CGA progressing to SBA. Toilet transfer SBA with cueing for hand placement and set up. [place] and garment mgmt SBA in standing. Returned to bedside chair SBA - instructed on use of reacher for LB dressing techniques, completed SBA with cueing,  able to assist as needed. Instructed on tub/shower transfer with tub bench set up - visual demo provided, able to complete SBA with cueing. All needs met, all questions answered.   OT Discharge Planning   OT Plan D/c OT   OT Discharge Recommendation (DC Rec) home with assist   OT Rationale for DC Rec Pt mobilizing well and met all OT goals - has good support at home   OT Brief overview of current status SBA fxl mob and ADLs   OT Total Distance Amb During Session (feet) 20   Total Session Time   Timed Code Treatment Minutes 24   Total Session Time (sum of timed and untimed services) 32   M [facility]                                                                                   OUTPATIENT OCCUPATIONAL THERAPY    PLAN OF TREATMENT FOR OUTPATIENT REHABILITATION   Patient's Last Name, First Name, Symone Duong YOB: 1980   Provider's Name   [facility]   Medical Record No.  [id number]     Onset Date: 02/27/25 Start of Care Date: 02/28/25     Medical Diagnosis:  S/p NIEVES               OT Diagnosis:  Decreased ind with ADLs and safety Certification Dates:  From: 02/28/25  To: 02/28/25     See note for plan of treatment, functional goals, and certification details.    I CERTIFY THE NEED FOR THESE SERVICES FURNISHED UNDER        THIS PLAN OF TREATMENT AND WHILE UNDER MY CARE (Physician co-signature of this document indicates review and certification of the therapy plan).

## 2025-02-28 NOTE — PLAN OF CARE
Problem: Adult Inpatient Plan of Care  Goal: Absence of Hospital-Acquired Illness or Injury  Intervention: Identify and Manage Fall Risk  Recent Flowsheet Documentation  Taken 2/28/2025 0020 by [place] RN  Safety Promotion/Fall Prevention:   activity supervised   assistive device/personal items within reach   increased rounding and observation   patient and family education   safety round/check completed   treat underlying cause   treat reversible contributory factors  Intervention: Prevent Skin Injury  Recent Flowsheet Documentation  Taken 2/27/2025 2351 by La Obrien RN  Body Position: supine, legs elevated  Goal: Readiness for Transition of Care  Intervention: Mutually Develop Transition Plan  Recent Flowsheet Documentation  Taken 2/28/2025 0600 by La Obrien RN  Equipment Currently Used at Home: cane, straight     Problem: Syncope  Goal: Absence of Syncopal Symptoms  Intervention: Manage Effect of Syncopal Symptoms  Recent Flowsheet Documentation  Taken 2/28/2025 0020 by La Obrien RN  Safety Promotion/Fall Prevention:   activity supervised   assistive device/personal items within reach   increased rounding and observation   patient and family education   safety round/check completed   treat underlying cause   treat reversible contributory factors     Problem: Comorbidity Management  Goal: Maintenance of Seizure Control  Intervention: Maintain Seizure Symptom Control  Recent Flowsheet Documentation  Taken 2/28/2025 0020 by La Obrien RN  Seizure Precautions:   activity supervised   clutter-free environment maintained   side rails padded  Medication Review/Management: medications reviewed     Problem: Surgery Nonspecified  Goal: Anesthesia/Sedation Recovery  Intervention: Optimize Anesthesia Recovery  Recent Flowsheet Documentation  Taken 2/28/2025 0020 by La Obrien RN  Safety Promotion/Fall Prevention:   activity supervised   assistive device/personal items within  reach   increased rounding and observation   patient and family education   safety round/check completed   treat underlying cause   treat reversible contributory factors  Goal: Effective Oxygenation and Ventilation  Intervention: Optimize Oxygenation and Ventilation  Recent Flowsheet Documentation  Taken 2025 2351 by La Obrien, RN  Activity Management: bedrest  Head of Bed (HOB) Positioning: HOB flat     Problem: Anemia  Goal: Anemia Symptom Improvement  Intervention: Monitor and Manage Anemia  Recent Flowsheet Documentation  Taken 2025 0020 by La Obrien RN  Safety Promotion/Fall Prevention:   activity supervised   assistive device/personal items within reach   increased rounding and observation   patient and family education   safety round/check completed   treat underlying cause   treat reversible contributory factors   Goal Outcome Evaluation:       Aox4, calm and cooperative. Denies pain, sob/. Episode of syncope/dizziness upon transfer to shift, hypotensive with MAP 42. Quickly stabilized after 5 mins of giving IV fluids. Dizziness resolved. MAP has now been in 70s overnight, continue to monitor, vitals q4. On bedrest, supine with legs elevated, TEDS on. Hemoglobin stable 8.8. ZOE negative for DVT. Started on magnesium protocol, within normal range. Right hip incision clean/dry/intact, CMS intact.

## 2025-02-28 NOTE — PROGRESS NOTES
Occupational Therapy Discharge Summary    Reason for therapy discharge:    All goals and outcomes met, no further needs identified.    Progress towards therapy goal(s). See goals on Care Plan in [facility] electronic health record for goal details.  Goals met    Therapy recommendation(s):    No further therapy is recommended. Return home with assist from family

## 2025-02-28 NOTE — H&P
[facility]    History and Physical - Hospitalist Service       Date of Admission:  2/27/2025    Assessment & Plan      Symone Negrete is a 44 year old female without much medical history except arthritis of the right hip status post replacement February 27, 2025 who is admitted with postop hypotension and acute blood loss anemia with associated syncope and myoclonic jerks.  Patient found to be hyponatremia with low magnesium.    Patient Active Problem List   Diagnosis    Hypomagnesemia    Hyponatremia    Intramuscular hematoma    Syncope, unspecified syncope type    Anemia, unspecified type        Syncope   Acute blood loss anemia  Intramuscular hematoma  Status post right hip arthroplasty  Hypomagnesemia  Hyponatremia  Chronic pain syndrome  Nicotine use     Diet:  Regular  DVT Prophylaxis: Pneumatic Compression Devices  Ordoñez Catheter: Not present  Lines: None     Cardiac Monitoring: None  Code Status:  Full    Clinically Significant Risk Factors Present on Admission   { TIP  This section helps capture the illness of the patient on admission.     - Review diagnoses highlighted in blue; right click, edit & delete if not appropriate   - If blank, no additional diagnoses identified   :[id number]}      # Hyponatremia: Lowest Na = 129 mmol/L in last 2 days, will monitor as appropriate  # Hypochloremia: Lowest Cl = 95 mmol/L in last 2 days, will monitor as appropriate    # Hypomagnesemia: Lowest Mg = 1.4 mg/dL in last 2 days, will replace as needed            # Anemia: based on hgb <11                  Disposition Plan   {TIP  It is advised to update the Medical Readiness for Discharge [MRD] daily, until the patient is 'Ready Now.' Last Documentation-    . Use the SmartList below to update for today:025456}  Medically Ready for Discharge: Anticipated in 2-4 Days           Michelle Frazier MD  Hospitalist Service  [facility]  Securely message with Baboom (more info)  Text  page via [facility] Paging/Directory     ______________________________________________________________________    Chief Complaint   S/p syncope        History of Present Illness     Symone Negrete is a 44 year old female without much medical history except arthritis of the right hip status post replacement February 27, 2025 who is admitted with postop hypotension and acute blood loss anemia with associated syncope and myoclonic jerks.  Patient found to be hyponatremia with low magnesium.    Patient was seen in the ER on admission.  She was awake alert oriented to place person and time and could provide a history.  Per reports, patient with right hip replacement this AM.  Per report, patient had episode of hypotension post procedure- SBP 50's and given norepinephrine.  Later this evening around 1700, patient was up and walking with staff when she had a syncopal episode.  C/o dizziness prior to syncope.  Patient was walking with staff, did not fall or sustain any injury. Staff there reported possible seizure like activity and assisted back into bed.  Paperwork with patient appears to be all intra-procedure. Unable to find how much or when the norepinephrine was given.  Given sedation, IVF, and ABX as well according to paperwork.       Preliminary workup done in the ER showed a sodium of 129, potassium 4.3, chloride 95, BUN and creatinine within normal limits.  Magnesium was 1.4 blood glucose was 231.  CBC showed a white count of 13.4 hemoglobin 9.8.  Per reports hemoglobin prior to procedure was 13.  Type and screen was done.    CTA of the left hip showed 4.9 x 6 intramuscular hematoma.  Patient is being admitted for close observation and pain management.      Narrative & Impression   EXAM: CTA ANGIOGRAM PELVIS  LOCATION: [facility]  DATE: 2/27/2025     INDICATION: Increased swelling in right leg and syncope s p right hip replacement earlier today  COMPARISON: None.  TECHNIQUE: Helical  acquisition through the pelvis was performed during the arterial phase of contrast enhancement. 2D and 3D reconstructions were performed by the CT technologist. Dose reduction techniques were used.   CONTRAST: isovue 370 90ml     FINDINGS:   ANGIOGRAM PELVIS: Normal caliber of the aortic bifurcation. The bilateral common iliac, external iliac and visualized superficial femoral arteries are patent.     PELVIS: Minimal free fluid within the pelvis. Diverticulosis without evidence of diverticulitis. The appendix is normal.     MUSCULOSKELETAL: Postsurgical changes of right total hip arthroplasty with expected subcutaneous emphysema throughout the right hip. There is a 4.9 x 6.0 cm intramuscular hematoma anterior to the right hip. Small hyperdense foci along the superior and   inferior aspect of the hematoma. No gross evidence of active extravasation.                                                                       IMPRESSION:  1.  Postsurgical changes of right total hip arthroplasty with a small intramuscular hematoma anterior to the right femoral neck/proximal femur. No active extravasation.          44-year-old gal she had a right total hip replaced earlier today with [facility] as At their outpatient facility postoperatively she was kind of hypotensive so they gave her Motrin fluids and then she got up to to walk at 1 point she she had a syncopal episode and passed out no trauma no falls but they they sent her in here to her pressures have been okay since she has been here at home and like she thinks has been hypotensive her thigh was a little bit larger what I would expect it to be postoperatively so did a CT scan of that there is a small hematoma there but I do not think that explains this blood loss because it can be syncopal her hemoglobin is 9.82 weeks ago was 13.4 so but she got a bunch of IV fluid support and AB just that you know she is feeling better here sugar color looks a little bit better.   She was just getting some IV fluids and suspect that but I might plan to get a watch her here tonight just keep hydrating her leg even though she got 3 L of fluid restrict her urine is still like super dark pan color so then she was just super dehydrated my guess but I do not have a watching her tonight just to make sure that that that sean to get any bigger that her hemoglobin dropped down suspect that so I called orthopedics I called [facility] they are aware that she is staying overnight and somebody will see her tomorrow to come to check on that leg still no neurologic deficits not like that in her legs and rest of workup for the syncopal episode so was syncope no seizures although still easily so what happened I think there is it when people passed out to get likely little myoclonic jerking episode I think that is what they saw that maybe this was seizures but talking to her I felt like she when she woke up she was not postictal she was not confused or disoriented she got a new where she was at what was going on so I do not know I do not think is a seizures at all not but not all okay    Past Medical History    Chronic right hip pain  Arthritis  Bursitis  Nicotine use  Elevated blood pressure    Past Surgical History   Tubal ligation    Prior to Admission Medications   Prior to Admission Medications   Prescriptions Last Dose Informant Patient Reported? Taking?   acetaminophen (TYLENOL) 500 MG tablet 2/26/2025  Yes Yes   Sig: Take 500-1,000 mg by mouth every 6 hours as needed for mild pain.      Facility-Administered Medications: None        Review of Systems    The 10 point Review of Systems is negative other than noted in the HPI or here.     Social History   Social History  Tobacco Use Types Packs/Day Years Used Date   Smoking Tobacco: Every Day Cigarettes     Started: 01/01/1997   Passive Smoke Exposure: Never           Smokeless Tobacco: Never                Family History   Family History  Medical History  Relation Name Comments   No Known Problems Birth Father       No Known Problems Birth Mother       No Known Problems Brother           Allergies   No Known Allergies     Physical Exam   Vital Signs: Temp: 97.8  F (36.6  C) Temp src: Oral BP: 100/59 Pulse: 85   Resp: 26 SpO2: 100 % O2 Device: None (Room air)    Weight: 136 lbs 10.96 oz      General Aox3, appropriate affect, NAD, on RA  HEENT  MMM, EOMI, PERRL  Chest Adeq E b/l, No wheezing  Heart RRR, No M/R/G  Abd- Soft, NT, BS+  - Deferred,   Extremity- Moving all extremities, No digital clubbing,   No edema  Neuro- Aox3, grossly non focal. S/p right hip arthroplasty    gait not checked + hematoma at surgical  site ***  Skin  Has no tattoo, No skin rash     Medical Decision Making   { TIP   MDM Calculator    MDM grid (w/ times)    Coding Support Chat  Billing is now based on time OR medical decision making complexity. Medical decision making included in your A&P does NOT need to be re-documented here.    :99657}    85 MINUTES SPENT BY ME on the date of service doing chart review, history, exam, documentation & further activities per the note.      ------------------ MEDICAL DECISION MAKING ------------------------------------------------------------------------------------------------------  MANAGEMENT DISCUSSED with the following over the past 24 hours: patient and care team       Data   ------------------------- PAST 24 HR DATA REVIEWED -----------------------------------------------    I have personally reviewed the following data over the past 24 hrs:    13.4 (H)  \   9.8 (L)   / 210     129 (L) 95 (L) 9.4 /  231 (H)   4.3 26 0.54 \     Trop: <6 BNP: N/A     INR:  1.07 PTT:  N/A   D-dimer:  N/A Fibrinogen:  N/A       Imaging results reviewed over the past 24 hrs:   Recent Results (from the past 24 hours)   CT Pelvis Angio w/o & w Contrast    Narrative    EXAM: CTA ANGIOGRAM PELVIS  LOCATION: [facility]  DATE:  2/27/2025    INDICATION: Increased swelling in right leg and syncope s p right hip replacement earlier today  COMPARISON: None.  TECHNIQUE: Helical acquisition through the pelvis was performed during the arterial phase of contrast enhancement. 2D and 3D reconstructions were performed by the CT technologist. Dose reduction techniques were used.   CONTRAST: isovue 370 90ml    FINDINGS:   ANGIOGRAM PELVIS: Normal caliber of the aortic bifurcation. The bilateral common iliac, external iliac and visualized superficial femoral arteries are patent.    PELVIS: Minimal free fluid within the pelvis. Diverticulosis without evidence of diverticulitis. The appendix is normal.    MUSCULOSKELETAL: Postsurgical changes of right total hip arthroplasty with expected subcutaneous emphysema throughout the right hip. There is a 4.9 x 6.0 cm intramuscular hematoma anterior to the right hip. Small hyperdense foci along the superior and   inferior aspect of the hematoma. No gross evidence of active extravasation.       Impression    IMPRESSION:  1.  Postsurgical changes of right total hip arthroplasty with a small intramuscular hematoma anterior to the right femoral neck/proximal femur. No active extravasation.

## 2025-02-28 NOTE — CONSULTS
ORTHOPEDIC CONSULTATION    Consultation  Symone Negrete, IMANI 1980, MRN [id number]    Hypomagnesemia [E83.42]  Hyponatremia [E87.1]  Intramuscular hematoma [T14.8XXA]  Syncope, unspecified syncope type [R55]  Anemia, unspecified type [D64.9]    PCP: Celsa [facility], [phone]   Code status:  Full Code       Extended Emergency Contact Information  Primary Emergency Contact: ALBA NEGRETE  Mobile Phone: [phone]  Relation: Spouse         IMPRESSION:  Patient is 1 day status post right total hip arthroplasty on 2025 with Dr. Cisneros and one of our outpatient surgery centers.  Patient demonstrated hypotension following the surgery and was transferred to the emergency department here at [facility] for further management     PLAN:  This patient was discussed with Dr. Farrell, on-call surgeon for [facility] and they are in agreement with the following plan.   -Continue normal postoperative cares for total hip arthroplasty.  Hip appears to be doing well following surgery  -PT/OT  -Weight-bear as tolerated right lower extremity  -Range of motion of the right hip as tolerated.  -Dressing to remain clean, dry, and intact at all times until 2-week postop visit.  -Medical cares per hospitalist.  Appreciate their excellent cares managing her hypotension  -Keflex for total of 7 days postoperatively  -Scheduled Tylenol, oxycodone as needed for pain management  -Follow-up with Dr. Cisneros at the 2-week dominik postop    Thank you for including [facility] in the care of Symone Negrete. It has been a pleasure participating in their care.        CHIEF COMPLAINT: <principal problem not specified>    HISTORY OF PRESENT ILLNESS:  The patient is seen in orthopedic consultation at the request of Glenis Rodríguez DO for status post right total hip arthroplasty.  The patient is a 44 year old female    Today patient is experiencing lightheadedness and dizziness.  She had a right total  "hip arthroplasty done with Dr. Cisneros in the outpatient surgery center at [facility] yesterday, 2/27/2025.  She experienced lightheadedness and dizziness and did have a fall following the surgery and was transferred to the [facility] emergency department for further management of her hypotension.  Today she is feeling a bit better but does have some ongoing lightheadedness and dizziness.  She has been on bedrest since coming into the hospital so was not attempted to sit upright or weight-bear at all.  She does feel some stiffness in her right hip but pain is fairly well-controlled.  Denies fever/chills.    ALLERGIES:   Review of patient's allergies indicates No Known Allergies      MEDICATIONS UPON ADMISSION:  Medications were reviewed.  They include:   Medications Prior to Admission   Medication Sig Dispense Refill Last Dose/Taking    acetaminophen (TYLENOL) 500 MG tablet Take 500-1,000 mg by mouth every 6 hours as needed for mild pain.   2/26/2025         SOCIAL HISTORY:   she        FAMILY HISTORY:  family history is not on file.      REVIEW OF SYSTEMS:   Reviewed with patient. See HPI, otherwise negative       PHYSICAL EXAMINATION:  Vitals: /58 (BP Location: Right arm)   Pulse 85   Temp 98.4  F (36.9  C) (Oral)   Resp 18   Ht 1.6 m (5' 3\")   Wt 62 kg (136 lb 11 oz)   SpO2 100%   BMI 24.21 kg/m    General: On examination, the patient is resting comfortably, NAD, awake, and alert and oriented to person, place, time, and, and general circumstances   SKIN: There is dressing over the right hip incision.  Dressing is clean, dry, and intact.  Appropriate swelling through the right hip and thigh.  Pulses:  Dorsalis pedis and posterior tibialis pulse is intact and equal bilaterally  Sensation: intact and equal bilaterally to the distal lower extremities.  Tenderness: Appropriately tender to palpation about the right hip.  No other tenderness.  ROM: DF/PF intact, wiggles toes.  Flexion/extension of " the knee is intact.  Deferred hip range of motion at this time  Motor: TIB ANT, PF, ELH, QUAD, HF 5/5  Contralateral side= Full range of motion, Negative joint instability findings, 5/5 motor groups about the joint, Non-tender.       RADIOGRAPHIC EVALUATION:  Personally reviewed  Narrative & Impression   EXAM: US LOWER EXTREMITY VENOUS DUPLEX RIGHT  LOCATION: [facility]  DATE: 2/28/2025     INDICATION: Leg swelling s p surgery  COMPARISON: None.  TECHNIQUE: Venous Duplex ultrasound of the right lower extremity with and without compression, augmentation and duplex. Color flow and spectral Doppler with waveform analysis performed.     FINDINGS: Exam includes the common femoral, femoral, popliteal, and contralateral common femoral veins as well as segmentally visualized deep calf veins and greater saphenous vein.      RIGHT: No deep vein thrombosis. No superficial thrombophlebitis. No popliteal cyst.                                                                      IMPRESSION:  1.  No deep venous thrombosis in the right lower extremity       PERTINENT LABS:  Personally reviewed  Recent Labs   Lab Test 02/28/25  1021 02/28/25  0600 02/28/25  0017 02/27/25  1903   INR  --   --   --  1.07   HGB 8.8* 8.5*   < > 9.8*   PLT  --  183  --  210    < > = values in this interval not displayed.         DAGO SALAZAR PA-C  Date: 2/28/2025  Time: 10:57 AM  [facility]    CC1:   Glenis Rodríguez DO

## 2025-03-01 ENCOUNTER — APPOINTMENT (OUTPATIENT)
Dept: PHYSICAL THERAPY | Facility: HOSPITAL | Age: 45
DRG: 312 | End: 2025-03-01
Attending: PHYSICIAN ASSISTANT
Payer: COMMERCIAL

## 2025-03-01 LAB
FERRITIN SERPL-MCNC: 146 NG/ML (ref 6–175)
HGB BLD-MCNC: 7.3 G/DL (ref 11.7–15.7)
HGB BLD-MCNC: 7.7 G/DL (ref 11.7–15.7)
HGB BLD-MCNC: 7.9 G/DL (ref 11.7–15.7)
IRON BINDING CAPACITY (ROCHE): 212 UG/DL (ref 240–430)
IRON SATN MFR SERPL: 6 % (ref 15–46)
IRON SERPL-MCNC: 12 UG/DL (ref 37–145)
MAGNESIUM SERPL-MCNC: 1.9 MG/DL (ref 1.7–2.3)
VIT B12 SERPL-MCNC: 445 PG/ML (ref 232–1245)

## 2025-03-01 PROCEDURE — 85018 HEMOGLOBIN: CPT | Performed by: INTERNAL MEDICINE

## 2025-03-01 PROCEDURE — 97530 THERAPEUTIC ACTIVITIES: CPT | Mod: GP

## 2025-03-01 PROCEDURE — 250N000013 HC RX MED GY IP 250 OP 250 PS 637: Performed by: PHYSICIAN ASSISTANT

## 2025-03-01 PROCEDURE — 82607 VITAMIN B-12: CPT | Performed by: INTERNAL MEDICINE

## 2025-03-01 PROCEDURE — 120N000001 HC R&B MED SURG/OB

## 2025-03-01 PROCEDURE — 97161 PT EVAL LOW COMPLEX 20 MIN: CPT | Mod: GP

## 2025-03-01 PROCEDURE — 83735 ASSAY OF MAGNESIUM: CPT | Performed by: INTERNAL MEDICINE

## 2025-03-01 PROCEDURE — 97116 GAIT TRAINING THERAPY: CPT | Mod: GP

## 2025-03-01 PROCEDURE — 82728 ASSAY OF FERRITIN: CPT | Performed by: INTERNAL MEDICINE

## 2025-03-01 PROCEDURE — 258N000003 HC RX IP 258 OP 636: Performed by: INTERNAL MEDICINE

## 2025-03-01 PROCEDURE — 36415 COLL VENOUS BLD VENIPUNCTURE: CPT | Performed by: INTERNAL MEDICINE

## 2025-03-01 PROCEDURE — 83550 IRON BINDING TEST: CPT | Performed by: INTERNAL MEDICINE

## 2025-03-01 PROCEDURE — 83540 ASSAY OF IRON: CPT | Performed by: INTERNAL MEDICINE

## 2025-03-01 PROCEDURE — 99232 SBSQ HOSP IP/OBS MODERATE 35: CPT | Performed by: INTERNAL MEDICINE

## 2025-03-01 PROCEDURE — 250N000013 HC RX MED GY IP 250 OP 250 PS 637: Performed by: INTERNAL MEDICINE

## 2025-03-01 PROCEDURE — 250N000011 HC RX IP 250 OP 636: Performed by: INTERNAL MEDICINE

## 2025-03-01 RX ORDER — ALBUTEROL SULFATE 90 UG/1
1-2 INHALANT RESPIRATORY (INHALATION)
Status: DISCONTINUED | OUTPATIENT
Start: 2025-03-01 | End: 2025-03-02 | Stop reason: HOSPADM

## 2025-03-01 RX ORDER — DIPHENHYDRAMINE HYDROCHLORIDE 50 MG/ML
25 INJECTION, SOLUTION INTRAMUSCULAR; INTRAVENOUS
Status: DISCONTINUED | OUTPATIENT
Start: 2025-03-01 | End: 2025-03-02 | Stop reason: HOSPADM

## 2025-03-01 RX ORDER — DIPHENHYDRAMINE HYDROCHLORIDE 50 MG/ML
50 INJECTION, SOLUTION INTRAMUSCULAR; INTRAVENOUS
Status: DISCONTINUED | OUTPATIENT
Start: 2025-03-01 | End: 2025-03-02 | Stop reason: HOSPADM

## 2025-03-01 RX ORDER — METHYLPREDNISOLONE SODIUM SUCCINATE 40 MG/ML
40 INJECTION INTRAMUSCULAR; INTRAVENOUS
Status: DISCONTINUED | OUTPATIENT
Start: 2025-03-01 | End: 2025-03-02 | Stop reason: HOSPADM

## 2025-03-01 RX ORDER — ALBUTEROL SULFATE 0.83 MG/ML
2.5 SOLUTION RESPIRATORY (INHALATION)
Status: DISCONTINUED | OUTPATIENT
Start: 2025-03-01 | End: 2025-03-02 | Stop reason: HOSPADM

## 2025-03-01 RX ORDER — MEPERIDINE HYDROCHLORIDE 25 MG/ML
25 INJECTION INTRAMUSCULAR; INTRAVENOUS; SUBCUTANEOUS
Status: DISCONTINUED | OUTPATIENT
Start: 2025-03-01 | End: 2025-03-02 | Stop reason: HOSPADM

## 2025-03-01 RX ADMIN — ASPIRIN 81 MG CHEWABLE TABLET 81 MG: 81 TABLET CHEWABLE at 09:10

## 2025-03-01 RX ADMIN — CEPHALEXIN 500 MG: 500 CAPSULE ORAL at 18:29

## 2025-03-01 RX ADMIN — CEPHALEXIN 500 MG: 500 CAPSULE ORAL at 11:58

## 2025-03-01 RX ADMIN — ASPIRIN 81 MG CHEWABLE TABLET 81 MG: 81 TABLET CHEWABLE at 20:34

## 2025-03-01 RX ADMIN — DOCUSATE SODIUM 100 MG: 100 CAPSULE, LIQUID FILLED ORAL at 20:35

## 2025-03-01 RX ADMIN — CEPHALEXIN 500 MG: 500 CAPSULE ORAL at 06:15

## 2025-03-01 RX ADMIN — ACETAMINOPHEN 975 MG: 325 TABLET, FILM COATED ORAL at 18:29

## 2025-03-01 RX ADMIN — ACETAMINOPHEN 975 MG: 325 TABLET, FILM COATED ORAL at 04:12

## 2025-03-01 RX ADMIN — ACETAMINOPHEN 975 MG: 325 TABLET, FILM COATED ORAL at 11:58

## 2025-03-01 RX ADMIN — IRON SUCROSE 200 MG: 20 INJECTION, SOLUTION INTRAVENOUS at 14:50

## 2025-03-01 RX ADMIN — CEPHALEXIN 500 MG: 500 CAPSULE ORAL at 00:35

## 2025-03-01 RX ADMIN — DOCUSATE SODIUM 100 MG: 100 CAPSULE, LIQUID FILLED ORAL at 09:10

## 2025-03-01 RX ADMIN — NICOTINE 1 PATCH: 14 PATCH, EXTENDED RELEASE TRANSDERMAL at 09:11

## 2025-03-01 ASSESSMENT — ACTIVITIES OF DAILY LIVING (ADL)
ADLS_ACUITY_SCORE: 38

## 2025-03-01 NOTE — DISCHARGE SUMMARY
[facility]  Hospitalist Discharge Summary      Date of Admission:  2/27/2025  Date of Discharge:  3/2/2025  Discharging Provider: Glenis Rodríguez DO  Discharge Service: Hospitalist Service    Discharge Diagnoses   Syncope  Postoperative acute Blood Loss Anemia  R intramuscular hematoma post R NIEVES 2/27/2025  Hypomagnesemia  Hypovolemic hyponatremia  Chronic Pain Syndrome  Nicotine Use    Clinically Significant Risk Factors          Follow-ups Needed After Discharge   Follow-up Appointments       Follow Up      Follow up with orthopedic surgery in 2 weeks        Follow Up Care      Please follow-up with Dr. Cisneros's team in 2 weeks at [facility]. Call our scheduling line at [phone] to make an appointment, if you do not already have one scheduled.        Hospital Follow-up with Existing Primary Care Provider (PCP)      Please see details below         Schedule Primary Care visit within: 14 Days   Recommended labs and Imaging (to be ordered by Primary Care Provider): HgB           Follow-up with PCP within 2 weeks for posthospital evaluation and recheck of hemoglobin    Follow up with ortho in 2 weeks for management of R NIEVES    Discharge Disposition   Discharged to home  Condition at discharge: Stable    Hospital Course   Symone Negrete is a 44 year old female with severe right hip arthritis s/p right NIEVES 2/27/2025 with postoperative complication of hypotension and syncope requiring an unspecified amount of norepinephrine prompting admission to [facility] from 2/27/2025 - 3/1/2025. Etiology of syncope multifactorial from post surgical sedation, blood loss anemia (drop in HgB from 13 to 7.3 with small amounts (5 x 6 cm) of right intramuscular hematoma around the right hip) and decrease appetite in prior days.  During hospital stay, her hemoglobin stabilized with no further bleeding.  Orthopedic surgery was consulted and did not recommend any exploratory surgery, but  rather outpatient follow-up in 2 weeks with continued Keflex x 7 days postop.  She was found to have iron deficiency anemia so she was given iron infusion x1 followed by ferrous sulfate 325 mg every other day PO (will be Rx this on discharge).  She will also follow-up with her PCP within 2 weeks for posthospital evaluation and hemoglobin recheck. On day of discharge on 3/2/2025, she was tolerating her meals and medications without adverse effects.  All questions by patient and  answered by me.  Stable for discharge 3/2/2025.    Consultations This Hospital Stay   ORTHOPEDIC SURGERY IP CONSULT  PHYSICAL THERAPY ADULT IP CONSULT  OCCUPATIONAL THERAPY ADULT IP CONSULT  PHYSICAL THERAPY ADULT IP CONSULT  OCCUPATIONAL THERAPY ADULT IP CONSULT    Code Status   Full Code    Time Spent on this Encounter   IGlenis DO, personally saw the patient today and spent greater than 30 minutes discharging this patient.       Glenis Rodríguez DO  [address] [address]  Phone: [phone]  Fax: [phone]  ______________________________________________________________________    Physical Exam   Vital Signs: Temp: 97.8  F (36.6  C) Temp src: Oral BP: 109/65 Pulse: 95   Resp: 18 SpO2: 100 % O2 Device: None (Room air)    Weight: 136 lbs 10.96 oz  General Aox3, appropriate affect, NAD, on RA  HEENT  MMM, EOMI, PERRL  Chest Adeq E b/l, No wheezing  Heart RRR, No M/R/G  Abd- Soft, NT, BS+  - Deferred,   Extremity- Moving all extremities, No digital clubbing,   No edema  Neuro- Aox3, grossly non focal. S/p right hip arthroplasty    gait not checked + hematoma at surgical  site with no active bleeding  Skin  Has no tattoo, No skin rash       [facility] Physician   [facility]    Discharge Orders      Reason for your hospital stay    S/p NIEVES     When to call - Contact Surgeon Team    You may experience symptoms that require follow-up  "before your scheduled appointment. Refer to the \"Stoplight Tool\" for instructions on when to contact your Surgeon Team if you are concerned about pain control, blood clots, constipation, or if you are unable to urinate.     When to call - Reach out to Urgent Care    If you are not able to reach your Surgeon Team and you need immediate care, go to the Orthopedic Walk-in Clinic or Urgent Care at your Surgeon's office.  Do NOT go to the Emergency Room unless you have shortness of breath, chest pain, or other signs of a medical emergency.     When to call - Reasons to Call 911    Call 911 immediately if you experience sudden-onset chest pain, arm weakness/numbness, slurred speech, or shortness of breath     Discharge Instruction - Breathing exercises    Perform breathing exercises using your Incentive Spirometer 10 times per hour while awake for 2 weeks.     Symptoms - Fever Management    A low grade fever can be expected after surgery.  Use acetaminophen (TYLENOL) as needed for fever management.  Contact your Surgeon Team if you have a fever greater than 101.5 F, chills, and/or night sweats.     Symptoms - Constipation management    Constipation (hard, dry bowel movements) is expected after surgery due to the combination of being less active, the anesthetic, and the opioid pain medication.  You can do the following to help reduce constipation:  ~  FLUIDS:  Drink clear liquids (water or Gatorade), or juice (apple/prune).  ~  DIET:  Eat a fiber rich diet.    ~  ACTIVITY:  Get up and move around several times a day.  Increase your activity as you are able.  MEDICATIONS:  Reduce the risk of constipation by starting medications before you are constipated.  You can take Miralax   (1 packet as directed) and/or a stool softener (Senokot 1-2 tablets 1-2 times a day).  If you already have constipation and these medications are not working, you can get magnesium citrate and use as directed.  If you continue to have constipation " you can try an over the counter suppository or enema.  Call your Surgeon Team if it has been greater than 3 days since your last bowel movement.     Symptoms - Reduced Urine Output    Changes in the amount of fluids you drank before and after surgery may result in problems urinating.  It is important to stay well-hydrated after surgery and drink plenty of water. If it has been greater than 8 hours since you have urinated despite drinking plenty of water, call your Surgeon Team.     Activity - Exercises to prevent blood clots    Unless otherwise directed by your Surgeon team, perform the following exercises at least three times per day for the first four weeks after surgery to prevent blood clots in your legs: 1) Point and flex your feet (Ankle Pumps), 2) Move your ankle around in big circles, 3) Wiggle your toes, 4) Walk, even for short distances, several times a day, will help decrease the risk of blood clots.     Comfort and Pain Management - Pain after Surgery    Pain after surgery is normal and expected.  You will have some amount of pain for several weeks after surgery.  Your pain will improve with time.  There are several things you can do to help reduce your pain including: rest, ice, elevation, and using pain medications as needed. Contact your Surgeon Team if you have pain that persists or worsens after surgery despite rest, ice, elevation, and taking your medication(s) as prescribed. Contact your Surgeon Team if you have new numbness, tingling, or weakness in your operative extremity.     Comfort and Pain Management - Swelling after Surgery    Swelling and/or bruising of the surgical extremity is common and may persist for several months after surgery. In addition to frequent icing and elevation, gentle compressive support with an ACE wrap or tubigrip may help with swelling. Apply compression regularly, removing at least twice daily to perform skin checks. Contact your Surgeon Team if your swelling  "increases and is NOT associated with an increase in your activity level, or if your swelling increases and is associated with redness and pain.     Comfort and Pain Management - LOWER Extremity Elevation    Swelling is expected for several months after surgery. This type of swelling is usually associated with gravity and activity, and can be improved with elevation.   The best way to do this is to get your \"toes above your nose\" by laying down and placing several pillows lengthwise under your calf and heel. When elevating your leg keep your knee completely straight. Perform this elevation as often as possible especially for the first two weeks after surgery.     Comfort and Pain Management - Cold therapy    Ice can be used to control swelling and discomfort after surgery. Place a thin towel over your operative site and apply the ice pack overtop. Leave ice pack in place for 20 minutes, then remove for 20 minutes. Repeat this 20 minutes on/20 minutes off routine as often as tolerated.     Medication Instructions - Acetaminophen (TYLENOL) Instructions    You were discharged with acetaminophen (TYLENOL) for pain management after surgery. Acetaminophen most effectively manages pain symptoms when it is taken on a schedule without missing doses (every four, six, or eight hours). Your Provider will prescribe a safe daily dose between 3000 - 4000 mg.  Do NOT exceed this daily dose. Most patients use acetaminophen for pain control for the first four weeks after surgery.  You can wean from this medication as your pain decreases.     Follow Up Care    Please follow-up with Dr. Cisneros's team in 2 weeks at [facility]. Call our scheduling line at [phone] to make an appointment, if you do not already have one scheduled.     Activity    Activity as tolerated     Return to Driving    Return to driving - Driving is NOT permitted until directed by your provider. Under no circumstance are you permitted to drive while " using narcotic pain medications.     Weight bearing as tolerated    Weight bearing as tolerated on your operative extremity.     Dressing / Wound Care - Wound    You have a clean dressing on your surgical wound. Dressing change instructions as follows: dressing will be removed at your follow-up appointment. Contact your Surgeon Team if you have increased redness, warmth around the surgical wound, and/or drainage from the surgical wound.     Dressing / Wound care - Shower with wound/dressing covered    You must COVER your dressing or incision with saran wrap (or any other non-permeable covering) to allow the incision to remain dry while showering.  You may shower 2 days after surgery as long as the surgical wound stays dry. Continue to cover your dressing or incision for showering until your first office visit.  You are strictly prohibited from soaking or submerging the surgical wound underwater.     Dressing / Wound Care - NO Tub Bathing    Tub bathing, swimming, or any other activities that will cause your incision to be submerged in water should be avoided until allowed by your Surgeon.     Medication instructions -  Anticoagulation - aspirin    Take the aspirin as prescribed for a total of four weeks after surgery.  This is given to help minimize your risk of blood clot.     Opioid Instructions (Less than 65 years)    You were discharged with an opioid medication (hydromorphone, oxycodone, hydrocodone, or tramadol). This medication should only be taken for breakthrough pain that is not controlled with acetaminophen (TYLENOL). If you rate your pain less than 3 you do not need this medication. Pain rating 0-3: You do not need this medication. Pain rating 4-6: Take 1 tablet every 4-6 hours as needed Pain rating 7-10: Take 2 tablets every 4-6 hours as needed. Do not exceed 6 tablets per day     Medication Instructions - Opioids - Tapering Instructions    In the first three days following surgery, your symptoms may  warrant use of the narcotic pain medication every four to six hours as prescribed. This is normal. As your pain symptoms improve, focus your efforts on decreasing (tapering) use of narcotic medications. The most successful tapering strategy is to first, decrease the number of tablets you take every 4-6 hours to the minimum prescribed. Then, increase the amount of time between doses. For example: First, taper to   or 1 tablet every 4-6 hours. Then, taper to   or 1 tablet every 6-8 hours. Then, taper to   or 1 tablet every 8-10 hours. Then, taper to   or 1 tablet every 10-12 hours. Then, taper to   or 1 tablet at bedtime. The bedtime dose can help with comfort during sleep and is typically the last dose to be discontinued after surgery.     No precautions    No precautions directed by your Provider.  You may perform range of motion activities as tolerated.     Reason for your hospital stay    syncope     Activity    Your activity upon discharge: activity as tolerated     Follow Up    Follow up with orthopedic surgery in 2 weeks     Full Code     Discharge Instruction - Regular Diet Adult    Return to your pre-surgery diet unless instructed otherwise     Diet    Follow this diet upon discharge: Current Diet:Orders Placed This Encounter      Regular Diet Adult      Discharge Instruction - Regular Diet Adult     Hospital Follow-up with Existing Primary Care Provider (PCP)    Please see details below            Significant Results and Procedures   Reviewed by me    Discharge Medications   Current Discharge Medication List        START taking these medications    Details   ferrous sulfate (FEROSUL) 325 (65 Fe) MG tablet Take 1 tablet (325 mg) by mouth every other day.  Qty: 30 tablet, Refills: 3    Associated Diagnoses: Anemia, unspecified type           CONTINUE these medications which have NOT CHANGED    Details   acetaminophen (TYLENOL) 500 MG tablet Take 500-1,000 mg by mouth every 6 hours as needed for mild pain.            Allergies   No Known Allergies

## 2025-03-01 NOTE — PROGRESS NOTES
[facility]    Medicine Progress Note - Hospitalist Service    Date of Admission:  2/27/2025    Assessment & Plan      Symone Negrete is a 44 year old female with severe right hip arthritis s/p right NIEVES 2/27/2025 with postoperative complication of hypotension requiring an unspecified amount of norepinephrine prompting admission to [facility] on 2/27/2025.  Hospital course complicated by acute blood loss anemia (CTA pelvis with small right intramuscular hematoma) and syncopal episode in ER while ambulating with extremity jerking.  If hemoglobin continues to stabilize by tomorrow morning, can discharge tomorrow    Postoperative Acute Blood Loss Anemia  R intramuscular hematoma post R NIEVES 2/27/2025  - CTA pelvis 2/27/2025: 5 x 6 cm intramuscular hematoma around right hip  - Hemoglobin 2/11/2025:13 --> 2/27/2025: 9.8  - No active bleeding noted  Plan  - Orthopedic consulted, no need for inpatient surgery.  Weightbearing as tolerated, outpatient follow-up in 2 weeks and Keflex x 7 days postop  - Type and screen performed on initial admission. Consent obtained by me today and placed on chart  - Monitor HgB this evening and tomorrow am  - Transfuse if HgB < 7    Fe deficiency anemia  Fe deficit 1433  Plan  - Start Fe 200mg daily IV today    Syncope  - Suspect orthostatic hypotension given volume depletion in previous days in setting of intramuscular hematoma as above  - S/p NS bolus 1.5 L in ER  Plan  - Monitor    Hypomagnesemia - resolved. Magnesium repletion to correct deficiency.  Hypovolemic hyponatremia - resolved  Chronic Pain Syndrome - Manage with oxycodone as needed and intravenous Dilaudid as needed, ensuring adequate pain relief while monitoring for side effects.  Nicotine Use - Provide nicotine replacement therapy to manage withdrawal symptoms and support smoking cessation efforts.    Diet:  Regular  DVT Prophylaxis: Pneumatic Compression Devices  Ordoñez Catheter: Not  present  Lines: None     Cardiac Monitoring: None  Code Status:  Full    Disposition Plan     Medically Ready for Discharge: Anticipate tomorrow if HgB stabilizes    Glenis Rodríguez DO  Hospitalist Service  [facility]  Securely message with [employer] (more info)  Text page via [employer] Paging/Directory   ______________________________________________________________________    Interval History   No overnight events.    On evaluation this morning, is resting on the bed and accompanied by  and 2 sons. Able to ambulate to bathroom and back to bed without any dizziness, weakness. Did admit to some muscle soreness. No hematochezia/hematuria when in the bathroom. She was updated of plans to start Fe IV infusions today and if HgB stabilizes by tomorrow, can discharge tomorrow on 3/2/2025    Physical Exam   Vital Signs: Temp: 97.3  F (36.3  C) Temp src: Oral BP: 99/55 Pulse: 94   Resp: 16 SpO2: 100 % O2 Device: None (Room air)    Weight: 136 lbs 10.96 oz    General Aox3, appropriate affect, NAD, on RA  HEENT  MMM, EOMI, PERRL  Chest Adeq E b/l, No wheezing  Heart RRR, No M/R/G  Abd- Soft, NT, BS+  - Deferred,   Extremity- Moving all extremities, No digital clubbing,   No edema  Neuro- Aox3, grossly non focal. S/p right hip arthroplasty    gait not checked + hematoma at surgical  site with no active bleeding  Skin  Has no tattoo, No skin rash    Medical Decision Making       60 MINUTES SPENT BY ME on the date of service doing chart review, history, exam, documentation & further activities per the note.      Data   ------------------------- PAST 24 HR DATA REVIEWED -----------------------------------------------

## 2025-03-01 NOTE — PROGRESS NOTES
Orthopedic Surgery progress note:  NAEO. Feeling better this morning.      AFVSS on RA  RLE   Dressing C/D/I  Fires GSC, EHL, TA  SILT SP, DP, TN  2+ DP      44F s/p R NIEVES at [facility] transferred to [facility] for persistent hypotension that is doing well. We will continue to mobilize and monitor her symptoms.      WBAT RLE, PT/OT  Keflex PO X 7 days  ASA 81mg BID  PO pain meds  General care, discharge when pain controlled and mobilizing appropriately    Rohit Esteves MD  [facility]

## 2025-03-01 NOTE — PROGRESS NOTES
03/01/25 0930   Appointment Info   Signing Clinician's Name / Credentials (PT) Amy Joya DPT   Living Environment   People in Home spouse;child(joel), adult;child(joel), dependent   Current Living Arrangements house   Home Accessibility stairs to enter home   Number of Stairs, Main Entrance 5   Stair Railings, Main Entrance none;railing on right side (ascending)  (2 stairs w/o railing, 3 w/ R rail ascending)   Living Environment Comments able to live on one level, tub/shower with grab bars and tub bench, standard height toilets near [facility]   Self-Care   Usual Activity Tolerance good   Current Activity Tolerance moderate   Equipment Currently Used at Home cane, straight;walker, rolling   Fall history within last six months no   Activity/Exercise/Self-Care Comment typically ind w/ ADLs and IADLs, works full time   General Information   Onset of Illness/Injury or Date of Surgery 02/27/28   Referring Physician Glenis Rodríguez,    Patient/Family Therapy Goals Statement (PT) return home   Pertinent History of Current Problem (include personal factors and/or comorbidities that impact the POC) Symone Negrete is a 44 year old female with severe right hip arthritis s/p right NIEVES 2/27/2025 with postoperative complication of hypotension requiring an unspecified amount of norepinephrine prompting admission to [facility] on 2/27/2025.  Hospital course complicated by acute blood loss anemia (CTA pelvis with small right intramuscular hematoma) and syncopal episode in ER while ambulating with extremity jerking.  If hemoglobin continues to stabilize by tomorrow morning, can discharge tomorrow   Existing Precautions/Restrictions no known precautions/restrictions   Weight-Bearing Status - LLE weight-bearing as tolerated   Weight-Bearing Status - RLE weight-bearing as tolerated   Cognition   Affect/Mental Status (Cognition) WNL   Pain Assessment   Patient Currently in Pain Yes, see Vital Sign  flowsheet  (stiffness in R hip)   Range of Motion (ROM)   Range of Motion ROM deficits secondary to pain   Strength (Manual Muscle Testing)   Strength (Manual Muscle Testing) Deficits observed during functional mobility   Bed Mobility   Bed Mobility supine-sit;sit-supine   Supine-Sit [place] (Bed Mobility) contact guard   Sit-Supine [place] (Bed Mobility) contact guard   Assistive Device (Bed Mobility) leg    Transfers   Transfers sit-stand transfer   Sit-Stand Transfer   Sit-Stand [place] (Transfers) supervision;contact guard   Assistive Device (Sit-Stand Transfers) walker, front-wheeled   Gait/Stairs (Locomotion)   [place] Level (Gait) supervision   Assistive Device (Gait) walker, front-wheeled   Distance in Feet (Gait) 40'   Pattern (Gait) step-to   Deviations/Abnormal Patterns (Gait) right sided deviations;antalgic;frank decreased;gait speed decreased   Right Sided Gait Deviations hip circumduction   Negotiation (Stairs) stairs independence;handrail location;number of steps;ascending technique;descending technique   [place] Level (Stairs) contact guard   Handrail Location (Stairs) right side (ascending);right side (descending)   Number of Steps (Stairs) 4   Ascending Technique (Stairs) step-to-step  (side stepping)   Descending Technique (Stairs) step-to-step  (side stepping)   Comment, (Gait/Stairs) stairs needing inc v/c for technique. Demonstrated single step with FWW technique, pt and spouse report good understanding.   Clinical Impression   Criteria for Skilled Therapeutic Intervention Yes, treatment indicated   PT Diagnosis (PT) impaired functional mobility, gait abnormality   Influenced by the following impairments decreased strength, decreased endurance   Functional limitations due to impairments gait, transfers, bed mob   Clinical Presentation (PT Evaluation Complexity) stable   Clinical Presentation Rationale pt presents as medically diagnosed   Clinical Decision Making  (Complexity) low complexity   Planned Therapy Interventions (PT) balance training;bed mobility training;gait training;home exercise program;neuromuscular re-education;patient/family education;strengthening;transfer training;stair training   Risk & Benefits of therapy have been explained evaluation/treatment results reviewed;patient   PT Total Evaluation Time   PT Eval, Low Complexity Minutes (01715) 10   Physical Therapy Goals   PT Frequency One time eval and treatment only   PT Predicted Duration/Target Date for Goal Attainment 03/08/25   PT Goals Bed Mobility;Transfers;Gait;Stairs   PT: Bed Mobility Supervision/stand-by assist;Supine to/from sit;Completed   PT: Transfers Supervision/stand-by assist;Sit to/from stand;Bed to/from chair;Assistive device;Completed   PT: Gait Supervision/stand-by assist;Assistive device;100 feet;Rolling walker;Completed   PT: Stairs Supervision/stand-by assist;4 stairs;Rail on right;Completed   Interventions   Interventions Quick Adds Gait Training;Therapeutic Activity   Therapeutic Activity   Therapeutic Activities: dynamic activities to improve functional performance Minutes (15253) 15   Symptoms Noted During/After Treatment Fatigue   Gait Training   Gait Training Minutes (12760) 15   Symptoms Noted During/After Treatment (Gait Training) fatigue   Treatment Detail/Skilled Intervention amb x 5', 155'. needing inc v/c for upright and relaxing shoulders. Step to/step through mixed pattern technique. No light headedness, does fatigue after 155'. Amb w/ SBA.   Distance in Feet 155', 5'   [place] Level (Gait Training) stand-by assist   Physical Assistance Level (Gait Training) verbal cues;supervision   Weight Bearing (Gait Training) weight-bearing as tolerated   Assistive Device (Gait Training) rolling walker   Gait Analysis Deviations decreased frank;decreased step length   Impairments (Gait Analysis/Training) pain   PT Discharge Planning   PT Plan dc PT   PT Discharge  Recommendation (DC Rec) home with assist   PT Rationale for DC Rec home with A from family, outpatient PT as needed with follow up from [place]   PT Brief overview of current status amb with FWW SBA, stairs x 4 with CGA, bed mob CGA   PT Total Distance Amb During Session (feet) 200   PT Equipment Needed at Discharge walker, rolling  (owns FWW, leg  - issued)   Physical Therapy Time and Intention   Timed Code Treatment Minutes 30   Total Session Time (sum of timed and untimed services) 40

## 2025-03-01 NOTE — PLAN OF CARE
Problem: Anemia  Goal: Anemia Symptom Improvement  Outcome: Progressing  Intervention: Monitor and Manage Anemia  Recent Flowsheet Documentation  Taken 2/28/2025 2017 by Brunilda Lepe, RN  Safety Promotion/Fall Prevention: activity supervised     Problem: Pain Acute  Goal: Optimal Pain Control and Function  Outcome: Progressing   Goal Outcome Evaluation:                  Pt alert and oriented x 4,pleasant and cooperative with cares,ambulated to bath room with SBA with walker and gait belt,pain manageable with scheduled tylenol and ice pack,surgical site dressing c/d/I,slept most of the night.

## 2025-03-02 VITALS
BODY MASS INDEX: 24.22 KG/M2 | HEART RATE: 95 BPM | SYSTOLIC BLOOD PRESSURE: 109 MMHG | RESPIRATION RATE: 18 BRPM | WEIGHT: 136.69 LBS | DIASTOLIC BLOOD PRESSURE: 65 MMHG | OXYGEN SATURATION: 100 % | TEMPERATURE: 97.8 F | HEIGHT: 63 IN

## 2025-03-02 LAB
HGB BLD-MCNC: 7.5 G/DL (ref 11.7–15.7)
MAGNESIUM SERPL-MCNC: 1.9 MG/DL (ref 1.7–2.3)

## 2025-03-02 PROCEDURE — 250N000013 HC RX MED GY IP 250 OP 250 PS 637: Performed by: INTERNAL MEDICINE

## 2025-03-02 PROCEDURE — 83735 ASSAY OF MAGNESIUM: CPT | Performed by: INTERNAL MEDICINE

## 2025-03-02 PROCEDURE — 85018 HEMOGLOBIN: CPT | Performed by: INTERNAL MEDICINE

## 2025-03-02 PROCEDURE — 250N000013 HC RX MED GY IP 250 OP 250 PS 637: Performed by: PHYSICIAN ASSISTANT

## 2025-03-02 PROCEDURE — 36415 COLL VENOUS BLD VENIPUNCTURE: CPT | Performed by: INTERNAL MEDICINE

## 2025-03-02 PROCEDURE — 99239 HOSP IP/OBS DSCHRG MGMT >30: CPT | Performed by: INTERNAL MEDICINE

## 2025-03-02 RX ORDER — FERROUS SULFATE 325(65) MG
325 TABLET ORAL EVERY OTHER DAY
Qty: 30 TABLET | Refills: 3 | Status: SHIPPED | OUTPATIENT
Start: 2025-03-02

## 2025-03-02 RX ADMIN — NICOTINE 1 PATCH: 14 PATCH, EXTENDED RELEASE TRANSDERMAL at 09:27

## 2025-03-02 RX ADMIN — CEPHALEXIN 500 MG: 500 CAPSULE ORAL at 11:00

## 2025-03-02 RX ADMIN — DOCUSATE SODIUM 100 MG: 100 CAPSULE, LIQUID FILLED ORAL at 09:26

## 2025-03-02 RX ADMIN — ACETAMINOPHEN 975 MG: 325 TABLET, FILM COATED ORAL at 10:58

## 2025-03-02 RX ADMIN — ACETAMINOPHEN 975 MG: 325 TABLET, FILM COATED ORAL at 02:48

## 2025-03-02 RX ADMIN — CEPHALEXIN 500 MG: 500 CAPSULE ORAL at 05:46

## 2025-03-02 RX ADMIN — CEPHALEXIN 500 MG: 500 CAPSULE ORAL at 00:40

## 2025-03-02 RX ADMIN — ASPIRIN 81 MG CHEWABLE TABLET 81 MG: 81 TABLET CHEWABLE at 09:26

## 2025-03-02 ASSESSMENT — ACTIVITIES OF DAILY LIVING (ADL)
ADLS_ACUITY_SCORE: 38

## 2025-03-02 NOTE — PLAN OF CARE
"Patient alert and oriented this shift. Stating she is having minimal pain at surgical site.  Scheduled tylenol effective for pain control.  Does not feel dizzy or lightheaded with ambulation.  Ready to discharge to home today.  Will have family to help with transportation.  Marilyn Shepherd RN   Problem: Adult Inpatient Plan of Care  Goal: Plan of Care Review  Description: The Plan of Care Review/Shift note should be completed every shift.  The Outcome Evaluation is a brief statement about your assessment that the patient is improving, declining, or no change.  This information will be displayed automatically on your shift  note.  Outcome: Met  Goal: Patient-Specific Goal (Individualized)  Description: You can add care plan individualizations to a care plan. Examples of Individualization might be:  \"Parent requests to be called daily at 9am for status\", \"I have a hard time hearing out of my right ear\", or \"Do not touch me to wake me up as it startles  me\".  Outcome: Met  Goal: Absence of Hospital-Acquired Illness or Injury  Outcome: Met  Intervention: Identify and Manage Fall Risk  Recent Flowsheet Documentation  Taken 3/2/2025 0900 by Marilyn Shepherd RN  Safety Promotion/Fall Prevention:   activity supervised   assistive device/personal items within reach   safety round/check completed  Goal: Optimal Comfort and Wellbeing  Outcome: Met  Goal: Readiness for Transition of Care  Outcome: Met     Problem: Comorbidity Management  Goal: Maintenance of Seizure Control  Outcome: Met  Intervention: Maintain Seizure Symptom Control  Recent Flowsheet Documentation  Taken 3/2/2025 0900 by Marilyn Shepherd RN  Medication Review/Management: medications reviewed     Problem: Electrolyte Imbalance  Goal: Electrolyte Balance  Outcome: Met     Problem: Surgery Nonspecified  Goal: Absence of Bleeding  Outcome: Met  Goal: Effective Bowel Elimination  Outcome: Met  Goal: Fluid and Electrolyte Balance  Outcome: Met  Goal: " Blood Glucose Level Within Targeted Range  Outcome: Met  Goal: Absence of Infection Signs and Symptoms  Outcome: Met  Goal: Anesthesia/Sedation Recovery  Outcome: Met  Intervention: Optimize Anesthesia Recovery  Recent Flowsheet Documentation  Taken 3/2/2025 0900 by Marilyn Shepherd RN  Safety Promotion/Fall Prevention:   activity supervised   assistive device/personal items within reach   safety round/check completed  Goal: Optimal Pain Control and Function  Outcome: Met  Goal: Nausea and Vomiting Relief  Outcome: Met  Goal: Effective Urinary Elimination  Outcome: Met  Goal: Effective Oxygenation and Ventilation  Outcome: Met     Problem: Anemia  Goal: Anemia Symptom Improvement  Outcome: Met  Intervention: Monitor and Manage Anemia  Recent Flowsheet Documentation  Taken 3/2/2025 0900 by Marilyn Shepherd RN  Safety Promotion/Fall Prevention:   activity supervised   assistive device/personal items within reach   safety round/check completed     Problem: Pain Acute  Goal: Optimal Pain Control and Function  Outcome: Met  Intervention: Prevent or Manage Pain  Recent Flowsheet Documentation  Taken 3/2/2025 0900 by Marilyn Shepherd RN  Medication Review/Management: medications reviewed   Goal Outcome Evaluation:

## 2025-03-02 NOTE — PROGRESS NOTES
"Orthopedic Progress Note      Assessment:    44-year-old female status post right total hip arthroplasty with Dr. Cisneros on 2/27/2025 who was transferred to and admitted to [facility] postoperatively for hypotension and acute blood loss anemia.    Plan:   -Continue normal postoperative cares for total hip arthroplasty.  Hip appears to be doing well following surgery  -PT/OT  -Weight-bear as tolerated right lower extremity  -Range of motion of the right hip as tolerated.  -Dressing to remain clean, dry, and intact at all times until 2-week postop visit.  -Medical cares per hospitalist.  Appreciate their excellent cares managing her hypotension  -Keflex for total of 7 days postoperatively  -Scheduled Tylenol, oxycodone as needed for pain management  -Follow-up with Dr. Cisneros at the 2-week dominik postop  -Okay to discharge from an orthopedic perspective once safely mobilizing      Subjective:  The patient reports that pain is well-controlled on the current regimen.  They deny any new numbness, tingling, or weakness to the operative extremity. They deny chest pain, shortness of breath, fevers, chills, nausea, vomiting.  She denies feeling lightheaded.  She has been able to be up and ambulating without assistance.  She reports being eager to go home this morning.    Objective:  /65 (BP Location: Left arm)   Pulse 95   Temp 97.8  F (36.6  C) (Oral)   Resp 18   Ht 1.6 m (5' 3\")   Wt 62 kg (136 lb 11 oz)   SpO2 100%   BMI 24.21 kg/m    The patient is awake, alert, and answers questions appropriately. Appears comfortable.   Focused exam of the surgical site demonstrates intact dressing without strikethrough or drainage  Sensation is intact to light touch in the saphenous, sural, tibial, DPN, SPN distributions.  Motor function is grossly intact to the tibialis anterior, gastrocsoleus complex, EHL, FHL.  DP pulse is palpable and the operative extremity is warm and well-perfused    Pertinent Labs   Lab " Results: personally reviewed.   Lab Results   Component Value Date    INR 1.07 02/27/2025     Lab Results   Component Value Date    WBC 11.6 (H) 02/28/2025    HGB 7.5 (L) 03/02/2025    HCT 24.4 (L) 02/28/2025    MCV 97 02/28/2025     02/28/2025     Lab Results   Component Value Date     02/28/2025    CO2 28 02/28/2025         Report completed by:  Drew Duffy MD  Date: 3/2/2025  Time: 8:03 AM

## 2025-03-02 NOTE — DISCHARGE INSTRUCTIONS
Continue cephalexin antibiotics as ordered by surgery. For 7 days    Every 6 hours     Given last at 11 AM    Has been    6 AM,  Noon, 6 PM and 11 PM- Midnight.

## 2025-03-02 NOTE — PLAN OF CARE
"Goal Outcome Evaluation:      Problem: Adult Inpatient Plan of Care  Goal: Patient-Specific Goal (Individualized)  Description: You can add care plan individualizations to a care plan. Examples of Individualization might be:  \"Parent requests to be called daily at 9am for status\", \"I have a hard time hearing out of my right ear\", or \"Do not touch me to wake me up as it startles  me\".  Outcome: Progressing     Problem: Adult Inpatient Plan of Care  Goal: Optimal Comfort and Wellbeing  Outcome: Progressing     Problem: Syncope  Goal: Absence of Syncopal Symptoms  Outcome: Progressing     Problem: Comorbidity Management  Goal: Maintenance of Seizure Control  Outcome: Progressing    A/O x4. VSS. Denies pain except soreness. Surgical incision dressing is dry and intact. Assist of one with ADLs.                          "

## 2025-03-02 NOTE — PLAN OF CARE
Problem: Adult Inpatient Plan of Care  Goal: Optimal Comfort and Wellbeing  Outcome: Progressing     Problem: Syncope  Goal: Absence of Syncopal Symptoms  Intervention: Manage Effect of Syncopal Symptoms  Recent Flowsheet Documentation  Taken 3/2/2025 0312 by Malachi Hardy RN  Safety Promotion/Fall Prevention:   activity supervised   assistive device/personal items within reach   safety round/check completed  Taken 3/1/2025 2041 by Malachi Hardy RN  Safety Promotion/Fall Prevention:   activity supervised   assistive device/personal items within reach   safety round/check completed     Problem: Pain Acute  Goal: Optimal Pain Control and Function  Outcome: Progressing  Intervention: Develop Pain Management Plan  Recent Flowsheet Documentation  Taken 3/2/2025 0248 by Malachi Hardy RN  Pain Management Interventions: medication (see MAR)  Intervention: Prevent or Manage Pain  Recent Flowsheet Documentation  Taken 3/2/2025 0312 by Malachi Hardy RN  Medication Review/Management: medications reviewed  Taken 3/1/2025 2041 by Malachi Hardy RN  Medication Review/Management: medications reviewed   Goal Outcome Evaluation:    A&O x4. Cooperative/pleasant. Scheduled tylenol and ice packs utilized for soreness/pain from surgical site. Dressing is c/d/I. SBA with walker and gait belt. Regular diet. Able to make needs known. Possible discharge today depending on hemoglobin.

## 2025-03-02 NOTE — PROGRESS NOTES
Patient ready to discharge home.  Went over discharge instructions. Escorted to front entrance upon discharge.  Marilyn Shepherd RN

## 2025-03-14 ENCOUNTER — TRANSFERRED RECORDS (OUTPATIENT)
Dept: HEALTH INFORMATION MANAGEMENT | Facility: CLINIC | Age: 45
End: 2025-03-14
Payer: COMMERCIAL

## 2025-03-15 ENCOUNTER — HEALTH MAINTENANCE LETTER (OUTPATIENT)
Age: 45
End: 2025-03-15

## 2025-04-11 ENCOUNTER — TRANSFERRED RECORDS (OUTPATIENT)
Dept: HEALTH INFORMATION MANAGEMENT | Facility: CLINIC | Age: 45
End: 2025-04-11
Payer: COMMERCIAL

## 2025-06-18 ENCOUNTER — ANCILLARY PROCEDURE (OUTPATIENT)
Dept: MAMMOGRAPHY | Facility: HOSPITAL | Age: 45
End: 2025-06-18
Payer: COMMERCIAL

## 2025-06-18 DIAGNOSIS — Z12.31 VISIT FOR SCREENING MAMMOGRAM: ICD-10-CM

## 2025-06-18 PROCEDURE — 77063 BREAST TOMOSYNTHESIS BI: CPT
